# Patient Record
Sex: FEMALE | Race: WHITE | NOT HISPANIC OR LATINO | Employment: UNEMPLOYED | URBAN - METROPOLITAN AREA
[De-identification: names, ages, dates, MRNs, and addresses within clinical notes are randomized per-mention and may not be internally consistent; named-entity substitution may affect disease eponyms.]

---

## 2017-01-13 ENCOUNTER — APPOINTMENT (OUTPATIENT)
Dept: VASCULAR LAB | Facility: MEDICAL CENTER | Age: 62
End: 2017-01-13
Attending: NURSE PRACTITIONER
Payer: MEDICARE

## 2017-01-30 ENCOUNTER — APPOINTMENT (OUTPATIENT)
Dept: VASCULAR LAB | Facility: MEDICAL CENTER | Age: 62
End: 2017-01-30
Attending: NURSE PRACTITIONER
Payer: MEDICARE

## 2017-02-03 ENCOUNTER — APPOINTMENT (OUTPATIENT)
Dept: VASCULAR LAB | Facility: MEDICAL CENTER | Age: 62
End: 2017-02-03
Attending: NURSE PRACTITIONER
Payer: MEDICARE

## 2017-02-10 ENCOUNTER — ANTICOAGULATION VISIT (OUTPATIENT)
Dept: VASCULAR LAB | Facility: MEDICAL CENTER | Age: 62
End: 2017-02-10
Attending: NURSE PRACTITIONER
Payer: MEDICARE

## 2017-02-10 DIAGNOSIS — I26.02 ACUTE SADDLE PULMONARY EMBOLISM WITH ACUTE COR PULMONALE (HCC): ICD-10-CM

## 2017-02-10 DIAGNOSIS — I63.00 CEREBROVASCULAR ACCIDENT (CVA) DUE TO THROMBOSIS OF PRECEREBRAL ARTERY (HCC): ICD-10-CM

## 2017-02-10 DIAGNOSIS — I27.82 CHRONIC SEPTIC PULMONARY EMBOLISM WITH ACUTE COR PULMONALE (HCC): ICD-10-CM

## 2017-02-10 DIAGNOSIS — I48.91 ATRIAL FIBRILLATION, UNSPECIFIED TYPE (HCC): ICD-10-CM

## 2017-02-10 DIAGNOSIS — I26.01 CHRONIC SEPTIC PULMONARY EMBOLISM WITH ACUTE COR PULMONALE (HCC): ICD-10-CM

## 2017-02-10 DIAGNOSIS — G45.9 TRANSIENT CEREBRAL ISCHEMIA, UNSPECIFIED TYPE: ICD-10-CM

## 2017-02-10 DIAGNOSIS — I82.401 ACUTE DEEP VEIN THROMBOSIS (DVT) OF RIGHT LOWER EXTREMITY, UNSPECIFIED VEIN (HCC): ICD-10-CM

## 2017-02-10 LAB
INR BLD: 2.5 (ref 0.9–1.2)
INR PPP: 2.5 (ref 2–3.5)

## 2017-02-10 PROCEDURE — 85610 PROTHROMBIN TIME: CPT

## 2017-02-10 PROCEDURE — 99211 OFF/OP EST MAY X REQ PHY/QHP: CPT | Performed by: NURSE PRACTITIONER

## 2017-02-10 NOTE — MR AVS SNAPSHOT
Elena Warren   2/10/2017 2:40 PM   Anticoagulation Visit   MRN: [id number]    Department:  Vascular Medicine   Dept Phone:  [phone]    Description:  Female : 1955   Provider:  [facility] EXAM 1           Allergies as of 2/10/2017     No Known Allergies      You were diagnosed with     Acute deep vein thrombosis (DVT) of right lower extremity, unspecified vein (CMS-HCC)   [5703716]       Atrial fibrillation, unspecified type (CMS-HCC)   [0036725]       Acute saddle pulmonary embolism with acute cor pulmonale (CMS-HCC)   [1692153]       Chronic septic pulmonary embolism with acute cor pulmonale (CMS-HCC)   [5607391]       Cerebrovascular accident (CVA) due to thrombosis of precerebral artery (CMS-HCC)   [5787612]       Transient cerebral ischemia, unspecified type   [0621875]         Vital Signs     Smoking Status                   Former Smoker           Basic Information     Date Of Birth Sex Race Ethnicity Preferred Language    1955 Female White Non- English      Your appointments     Feb 10, 2017  2:40 PM   Established Patient with [facility] EXAM 1   [facility]  (--)    [address] [place] [id number]   [phone]              Problem List              ICD-10-CM Priority Class Noted - Resolved    Atrial fibrillation [427.31] I48.91   2015 - Present    Deep vein thrombosis [453.40] I82.409   2015 - Present    Acute pulmonary embolism [415.19] I26.99   2015 - Present    Pulmonary embolism [415.19] I26.99   2015 - Present    Stroke [434.91] I63.9   2015 - Present    Transient ischemic attack [435.9] G45.9   2015 - Present    Deep vein thrombosis (DVT) (CMS-[facility]) I82.409   11/10/2015 - Present    Atrial fibrillation (CMS-[facility]) I48.91   11/10/2015 - Present    Hyperglycemia R73.9   2016 - Present    Bilateral leg edema R60.0   2016 - Present    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    5/14/2016 - Present    PRAMOD (obstructive sleep apnea) G47.33   5/14/2016 - Present    Hyperlipidemia E78.5   5/14/2016 - Present    Hypertension I10   5/14/2016 - Present    CHF (congestive heart failure) (CMS-HCC) I50.9   5/14/2016 - Present    Hemiparesis affecting left side as late effect of cerebrovascular accident (CVA) (CMS-HCC) I69.354   5/14/2016 - Present      Health Maintenance        Date Due Completion Dates    IMM DTaP/Tdap/Td Vaccine (1 - Tdap) 12/4/1973 ---    PAP SMEAR 12/4/1975 ---    MAMMOGRAM 12/4/1994 ---    COLONOSCOPY 12/4/2004 ---            Results     POCT Protime      Component    INR    2.5                        Current Immunizations     13-VALENT PCV PREVNAR 9/27/2016    Influenza Vaccine Adult HD 9/27/2016    SHINGLES VACCINE 10/4/2016      Below and/or attached are the medications your provider expects you to take. Review all of your home medications and newly ordered medications with your provider and/or pharmacist. Follow medication instructions as directed by your provider and/or pharmacist. Please keep your medication list with you and share with your provider. Update the information when medications are discontinued, doses are changed, or new medications (including over-the-counter products) are added; and carry medication information at all times in the event of emergency situations     Allergies:  No Known Allergies          Medications  Valid as of: February 10, 2017 -  2:36 PM    Generic Name Brand Name Tablet Size Instructions for use    AmLODIPine Besylate (Tab) NORVASC 10 MG Take 1 Tab by mouth every day.        Atorvastatin Calcium (Tab) LIPITOR 20 MG Take 1 Tab by mouth every day.        Carvedilol (Tab) COREG 25 MG Take 1 Tab by mouth 2 times a day, with meals.        Gabapentin (Tab) NEURONTIN 600 MG Take 1 Tab by mouth 2 times a day.        Hydrocodone-Acetaminophen (Tab) NORCO 5-325 MG Take 1 Tab by mouth every 12 hours as needed (for pain).        HydrOXYzine HCl  (Tab) ATARAX 25 MG Take 1-2 Tabs by mouth every 8 hours as needed for Anxiety.        Warfarin Sodium (Tab) COUMADIN 6 MG Take one-half to one (1/2-1) tablet daily as directed by coumadin clinic        Warfarin Sodium (Tab) COUMADIN 6 MG Take 1 Tab by mouth every day.        .                 Medicines prescribed today were sent to:     [facility] [address] [place] [address] (), [place] - [address]    [address] () [place] [address]    Phone: [phone] Fax: [phone]    Open 24 Hours?: No      Medication refill instructions:       If your prescription bottle indicates you have medication refills left, it is not necessary to call your provider’s office. Please contact your pharmacy and they will refill your medication.    If your prescription bottle indicates you do not have any refills left, you may request refills at any time through one of the following ways: The online [employer] system (except Urgent Care), by calling your provider’s office, or by asking your pharmacy to contact your provider’s office with a refill request. Medication refills are processed only during regular business hours and may not be available until the next business day. Your provider may request additional information or to have a follow-up visit with you prior to refilling your medication.   *Please Note: Medication refills are assigned a new Rx number when refilled electronically. Your pharmacy may indicate that no refills were authorized even though a new prescription for the same medication is available at the pharmacy. Please request the medicine by name with the pharmacy before contacting your provider for a refill.        Warfarin Dosing Calendar   February 2017 Details    Sun Mon Tue Wed Thu Fri Sat        1               2               3               4                 5               6               7               8               9               10   2.5   6 mg   See details      11      3 mg           12      3 mg            13      6 mg         14      3 mg         15      6 mg         16      3 mg         17      6 mg         18      3 mg           19      3 mg         20      6 mg         21      3 mg         22      6 mg         23      3 mg         24      6 mg         25      3 mg           26      3 mg         27      6 mg         28      3 mg              Date Details   02/10 This INR check   INR: 2.5               How to take your warfarin dose     To take:  3 mg Take 0.5 of a 6 mg tablet.    To take:  6 mg Take 1 of the 6 mg tablets.           Warfarin Dosing Calendar   March 2017 Details    Sun Mon Tue Wed Thu Fri Sat        1      6 mg         2      3 mg         3      6 mg         4      3 mg           5      3 mg         6      6 mg         7      3 mg         8      6 mg         9      3 mg         10      6 mg         11      3 mg           12      3 mg         13      6 mg         14      3 mg         15      6 mg         16      3 mg         17      6 mg         18      3 mg           19      3 mg         20      6 mg         21      3 mg         22      6 mg         23      3 mg         24      6 mg         25      3 mg           26      3 mg         27      6 mg         28      3 mg         29      6 mg         30      3 mg         31      6 mg           Date Details   No additional details    Date of next INR:  3/31/2017         How to take your warfarin dose     To take:  3 mg Take 0.5 of a 6 mg tablet.    To take:  6 mg Take 1 of the 6 mg tablets.              [employer] Access Code: [email or web address]  Expires: 3/12/2017  2:36 PM    [employer]  A secure, online tool to manage your health information     [employer]® is a secure, online tool that connects you to your personalized health information from the privacy of your home -- day or night - making it very easy for you to manage your healthcare. Once the activation process is completed, you can even access your medical information using the  [facility] taylor, which is available for free in the Apple Taylor store or Google Play store.     [facility] provides the following levels of access (as shown below):   My Chart Features   [facility] Doctor [facility]  Specialists [facility]  Urgent  Care [facility]  Primary Care  Doctor   Email your healthcare team securely and privately 24/7 X X X    Manage appointments: schedule your next appointment; view details of past/upcoming appointments X      Request prescription refills. X      View recent personal medical records, including lab and immunizations X X X X   View health record, including health history, allergies, medications X X X X   Read reports about your outpatient visits, procedures, consult and ER notes X X X X   See your discharge summary, which is a recap of your hospital and/or ER visit that includes your diagnosis, lab results, and care plan. X X       How to register for [facility]:  1. Go to  [email or web address].  2. Click on the Sign Up Now box, which takes you to the New Member Sign Up page. You will need to provide the following information:  a. Enter your [facility] Access Code exactly as it appears at the top of this page. (You will not need to use this code after you’ve completed the sign-up process. If you do not sign up before the expiration date, you must request a new code.)   b. Enter your date of birth.   c. Enter your home email address.   d. Click Submit, and follow the next screen’s instructions.  3. Create a [facility] ID. This will be your [facility] login ID and cannot be changed, so think of one that is secure and easy to remember.  4. Create a [facility] password. You can change your password at any time.  5. Enter your Password Reset Question and Answer. This can be used at a later time if you forget your password.   6. Enter your e-mail address. This allows you to receive e-mail notifications when new information is available in [facility].  7. Click Sign Up. You can now view your health  information.    For assistance activating your [employer] account, call [phone]

## 2017-02-10 NOTE — PROGRESS NOTES
Anticoagulation Summary as of 2/10/2017     INR goal 2.0-3.0   Selected INR 2.5 (2/10/2017)   Maintenance plan 6 mg (6 mg x 1) on Mon, Wed, Fri; 3 mg (6 mg x 0.5) all other days   Weekly total 30 mg   Plan last modified NORMA Flores (5/22/2015)   Next INR check 3/31/2017   Target end date Indefinite    Indications   Deep vein thrombosis (DVT) (CMS-[facility]) [I82.409]  Atrial fibrillation (CMS-[facility]) [I48.91]  Pulmonary embolism [415.19] [I26.99]  Stroke [434.91] [I63.9]  Transient ischemic attack [435.9] [G45.9]         Anticoagulation Episode Summary     INR check location     Preferred lab     Send INR reminders to     Comments       Anticoagulation Care Providers     Provider Role Specialty Phone number    Carole Mason M.D. Referring Internal Medicine [phone]    [facility] Responsible  [phone]        Patient is therapeutic today. Denies any medication or diet changes. No current symptoms of bleeding or thrombosis reported. Continue current regimen. Follow up in 8 weeks.    Next Appointment: Pt will call to schedule next appt    Nia YEN

## 2017-03-30 ENCOUNTER — APPOINTMENT (OUTPATIENT)
Dept: VASCULAR LAB | Facility: MEDICAL CENTER | Age: 62
End: 2017-03-30
Attending: INTERNAL MEDICINE
Payer: MEDICARE

## 2017-06-21 ENCOUNTER — TELEPHONE (OUTPATIENT)
Dept: CARDIOLOGY | Facility: MEDICAL CENTER | Age: 62
End: 2017-06-21

## 2017-06-21 NOTE — TELEPHONE ENCOUNTER
Elena has been identified as a patient who could benefit from the services of the Heart Failure Program with [facility] Cardiology department. Please review the pended referral order and sign if this is a service that you wish for Elena to receive.

## 2017-08-09 ENCOUNTER — ANTICOAGULATION VISIT (OUTPATIENT)
Dept: VASCULAR LAB | Facility: MEDICAL CENTER | Age: 62
End: 2017-08-09
Attending: INTERNAL MEDICINE
Payer: MEDICARE

## 2017-08-09 VITALS — SYSTOLIC BLOOD PRESSURE: 117 MMHG | HEART RATE: 56 BPM | DIASTOLIC BLOOD PRESSURE: 50 MMHG

## 2017-08-09 DIAGNOSIS — I63.00 CEREBROVASCULAR ACCIDENT (CVA) DUE TO THROMBOSIS OF PRECEREBRAL ARTERY (HCC): ICD-10-CM

## 2017-08-09 DIAGNOSIS — I27.82 CHRONIC SEPTIC PULMONARY EMBOLISM WITH ACUTE COR PULMONALE (HCC): ICD-10-CM

## 2017-08-09 DIAGNOSIS — G45.9 TRANSIENT CEREBRAL ISCHEMIA, UNSPECIFIED TYPE: ICD-10-CM

## 2017-08-09 DIAGNOSIS — I26.01 CHRONIC SEPTIC PULMONARY EMBOLISM WITH ACUTE COR PULMONALE (HCC): ICD-10-CM

## 2017-08-09 DIAGNOSIS — I48.91 ATRIAL FIBRILLATION, UNSPECIFIED TYPE (HCC): ICD-10-CM

## 2017-08-09 DIAGNOSIS — I82.401 ACUTE DEEP VEIN THROMBOSIS (DVT) OF RIGHT LOWER EXTREMITY, UNSPECIFIED VEIN (HCC): ICD-10-CM

## 2017-08-09 LAB — INR PPP: 2.1 (ref 2–3.5)

## 2017-08-09 PROCEDURE — 99212 OFFICE O/P EST SF 10 MIN: CPT | Performed by: NURSE PRACTITIONER

## 2017-08-09 PROCEDURE — 85610 PROTHROMBIN TIME: CPT

## 2017-08-09 RX ORDER — WARFARIN SODIUM 3 MG/1
TABLET ORAL
Qty: 30 TAB | Refills: 1 | Status: SHIPPED | OUTPATIENT
Start: 2017-08-09 | End: 2017-10-30 | Stop reason: SDUPTHER

## 2017-08-09 NOTE — PROGRESS NOTES
Anticoagulation Summary as of 8/9/2017     INR goal 2.0-3.0   Selected INR 2.1 (8/9/2017)   Maintenance plan 6 mg (6 mg x 1) on Mon, Wed, Fri; 3 mg (6 mg x 0.5) all other days   Weekly total 30 mg   Plan last modified NORMA Flores (5/22/2015)   Next INR check 10/31/2017   Target end date Indefinite    Indications   Deep vein thrombosis (DVT) (CMS-HCC) [I82.409]  Atrial fibrillation (CMS-HCC) [I48.91]  Pulmonary embolism [415.19] [I26.99]  Stroke [434.91] [I63.9]  Transient ischemic attack [435.9] [G45.9]         Anticoagulation Episode Summary     INR check location     Preferred lab     Send INR reminders to     Comments       Anticoagulation Care Providers     Provider Role Specialty Phone number    Carole Mason M.D. Referring Internal Medicine [phone]    [facility] Responsible  [phone]        Patient is therapeutic today. Denies any medication or diet changes. No current symptoms of bleeding or thrombosis reported. States she is having trouble breaking her 6 mg tablets in half, even with a pill cutter. Will send rx for 3 mg tablets. Education given regarding new tablet strength and color. She prefers to use the 6 mg tablets as well. Continue current regimen. Follow up in 8-10 weeks. Pt will call for next appointment. Encouraged to go no longer than 12 weeks between visits.    Next Appointment: Pt will call to schedule next visit    Nia YEN

## 2017-08-09 NOTE — MR AVS SNAPSHOT
Elena Warren   2017 11:15 AM   Anticoagulation Visit   MRN: [id number]    Department:  Vascular Medicine   Dept Phone:  [phone]    Description:  Female : 1955   Provider:  [facility] EXAM 4           Allergies as of 2017     No Known Allergies      You were diagnosed with     Acute deep vein thrombosis (DVT) of right lower extremity, unspecified vein (CMS-[facility])   [6956079]       Atrial fibrillation, unspecified type (CMS-[facility])   [2941849]       Chronic septic pulmonary embolism with acute cor pulmonale (CMS-[facility])   [3019718]       Cerebrovascular accident (CVA) due to thrombosis of precerebral artery (CMS-[facility])   [0406083]       Transient cerebral ischemia, unspecified type   [2107868]         Vital Signs     Blood Pressure Pulse Smoking Status             117/50 mmHg 56 Former Smoker         Basic Information     Date Of Birth Sex Race Ethnicity Preferred Language    1955 Female White Non- English      Your appointments     Aug 09, 2017 11:15 AM   Established Patient with [facility] EXAM 4   [facility]  (--)    [address] [place] [address]   [phone]              Problem List              ICD-10-CM Priority Class Noted - Resolved    Atrial fibrillation [427.31] I48.91   2015 - Present    Deep vein thrombosis [453.40] I82.409   2015 - Present    Acute pulmonary embolism [415.19] I26.99   2015 - Present    Pulmonary embolism [415.19] I26.99   2015 - Present    Stroke [434.91] I63.9   2015 - Present    Transient ischemic attack [435.9] G45.9   2015 - Present    Deep vein thrombosis (DVT) (CMS-[facility]) I82.409   11/10/2015 - Present    Atrial fibrillation (CMS-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   2016 - Present    PRAMOD (obstructive sleep apnea) G47.33    5/14/2016 - Present    Hyperlipidemia E78.5   5/14/2016 - Present    Hypertension I10   5/14/2016 - Present    CHF (congestive heart failure) (CMS-HCC) I50.9   5/14/2016 - Present    Hemiparesis affecting left side as late effect of cerebrovascular accident (CVA) (CMS-HCC) I69.354   5/14/2016 - Present      Health Maintenance        Date Due Completion Dates    IMM DTaP/Tdap/Td Vaccine (1 - Tdap) 12/4/1973 ---    PAP SMEAR 12/4/1975 ---    MAMMOGRAM 12/4/1994 ---    COLONOSCOPY 12/4/2004 ---    IMM INFLUENZA (1) 9/1/2017 9/27/2016            Results     POCT Protime      Component    INR    2.1                        Current Immunizations     13-VALENT PCV PREVNAR 9/27/2016    Influenza Vaccine Adult HD 9/27/2016    SHINGLES VACCINE 10/4/2016      Below and/or attached are the medications your provider expects you to take. Review all of your home medications and newly ordered medications with your provider and/or pharmacist. Follow medication instructions as directed by your provider and/or pharmacist. Please keep your medication list with you and share with your provider. Update the information when medications are discontinued, doses are changed, or new medications (including over-the-counter products) are added; and carry medication information at all times in the event of emergency situations     Allergies:  No Known Allergies          Medications  Valid as of: August 09, 2017 - 10:59 AM    Generic Name Brand Name Tablet Size Instructions for use    AmLODIPine Besylate (Tab) NORVASC 10 MG Take 1 Tab by mouth every day.        Atorvastatin Calcium (Tab) LIPITOR 20 MG Take 1 Tab by mouth every day.        Carvedilol (Tab) COREG 25 MG Take 1 Tab by mouth 2 times a day, with meals.        Gabapentin (Tab) NEURONTIN 600 MG Take 1 Tab by mouth 2 times a day.        Hydrocodone-Acetaminophen (Tab) NORCO 5-325 MG Take 1 Tab by mouth every 12 hours as needed (for pain).        HydrOXYzine HCl (Tab) ATARAX 25 MG Take  1-2 Tabs by mouth every 8 hours as needed for Anxiety.        Warfarin Sodium (Tab) COUMADIN 6 MG Take one-half to one (1/2-1) tablet daily as directed by coumadin clinic        Warfarin Sodium (Tab) COUMADIN 6 MG Take 1 Tab by mouth every day.        .                 Medicines prescribed today were sent to:     [facility] [address] [place] [address] (), [place] - [address]    [address] () [place] [address]    Phone: [phone] Fax: [phone]    Open 24 Hours?: No      Medication refill instructions:       If your prescription bottle indicates you have medication refills left, it is not necessary to call your provider’s office. Please contact your pharmacy and they will refill your medication.    If your prescription bottle indicates you do not have any refills left, you may request refills at any time through one of the following ways: The online [employer] system (except Urgent Care), by calling your provider’s office, or by asking your pharmacy to contact your provider’s office with a refill request. Medication refills are processed only during regular business hours and may not be available until the next business day. Your provider may request additional information or to have a follow-up visit with you prior to refilling your medication.   *Please Note: Medication refills are assigned a new Rx number when refilled electronically. Your pharmacy may indicate that no refills were authorized even though a new prescription for the same medication is available at the pharmacy. Please request the medicine by name with the pharmacy before contacting your provider for a refill.        Warfarin Dosing Calendar   August 2017 Details    Sun Mon Tue Wed Thu Fri Sat       1               2               3               4               5                 6               7               8               9   2.1   6 mg   See details      10      3 mg         11      6 mg         12      3 mg           13      3 mg           14      6 mg         15      3 mg         16      6 mg         17      3 mg         18      6 mg         19      3 mg           20      3 mg         21      6 mg         22      3 mg         23      6 mg         24      3 mg         25      6 mg         26      3 mg           27      3 mg         28      6 mg         29      3 mg         30      6 mg         31      3 mg            Date Details   08/09 This INR check   INR: 2.1               How to take your warfarin dose     To take:  3 mg Take 0.5 of a 6 mg tablet.    To take:  6 mg Take 1 of the 6 mg tablets.           Warfarin Dosing Calendar   September 2017 Details    Sun Mon Tue Wed Thu Fri Sat          1      6 mg         2      3 mg           3      3 mg         4      6 mg         5      3 mg         6      6 mg         7      3 mg         8      6 mg         9      3 mg           10      3 mg         11      6 mg         12      3 mg         13      6 mg         14      3 mg         15      6 mg         16      3 mg           17      3 mg         18      6 mg         19      3 mg         20      6 mg         21      3 mg         22      6 mg         23      3 mg           24      3 mg         25      6 mg         26      3 mg         27      6 mg         28      3 mg         29      6 mg         30      3 mg          Date Details   No additional details            How to take your warfarin dose     To take:  3 mg Take 0.5 of a 6 mg tablet.    To take:  6 mg Take 1 of the 6 mg tablets.           Warfarin Dosing Calendar   October 2017 Details    Sun Mon Tue Wed Thu Fri Sat     1      3 mg         2      6 mg         3      3 mg         4      6 mg         5      3 mg         6      6 mg         7      3 mg           8      3 mg         9      6 mg         10      3 mg         11      6 mg         12      3 mg         13      6 mg         14      3 mg           15      3 mg         16      6 mg         17      3 mg         18      6 mg         19       3 mg         20      6 mg         21      3 mg           22      3 mg         23      6 mg         24      3 mg         25      6 mg         26      3 mg         27      6 mg         28      3 mg           29      3 mg         30      6 mg         31      3 mg              Date Details   No additional details    Date of next INR:  10/31/2017         How to take your warfarin dose     To take:  3 mg Take 0.5 of a 6 mg tablet.    To take:  6 mg Take 1 of the 6 mg tablets.              [employer] Social Access Code: [email or web address]  Expires: 9/8/2017 10:59 AM    Salmon Social  A secure, online tool to manage your health information     [employer]’s Salmon Social® is a secure, online tool that connects you to your personalized health information from the privacy of your home -- day or night - making it very easy for you to manage your healthcare. Once the activation process is completed, you can even access your medical information using the Salmon Social taylor, which is available for free in the [employer] Taylor store or Google Play store.     Salmon Social provides the following levels of access (as shown below):   My Chart Features   [facility] Doctor [facility]  Specialists [facility]  Urgent  Care Non-[facility]  Primary Care  Doctor   Email your healthcare team securely and privately 24/7 X X X    Manage appointments: schedule your next appointment; view details of past/upcoming appointments X      Request prescription refills. X      View recent personal medical records, including lab and immunizations X X X X   View health record, including health history, allergies, medications X X X X   Read reports about your outpatient visits, procedures, consult and ER notes X X X X   See your discharge summary, which is a recap of your hospital and/or ER visit that includes your diagnosis, lab results, and care plan. X X       How to register for [employer] Social:  1. Go to  [email or web address].  2. Click on the Sign Up Now box, which takes you to the New Member  Sign Up page. You will need to provide the following information:  a. Enter your [employer] Access Code exactly as it appears at the top of this page. (You will not need to use this code after you’ve completed the sign-up process. If you do not sign up before the expiration date, you must request a new code.)   b. Enter your date of birth.   c. Enter your home email address.   d. Click Submit, and follow the next screen’s instructions.  3. Create a Qustreet ID. This will be your [employer]reet login ID and cannot be changed, so think of one that is secure and easy to remember.  4. Create a [employer]reet password. You can change your password at any time.  5. Enter your Password Reset Question and Answer. This can be used at a later time if you forget your password.   6. Enter your e-mail address. This allows you to receive e-mail notifications when new information is available in [employer]reet.  7. Click Sign Up. You can now view your health information.    For assistance activating your [employer] account, call [phone]

## 2017-08-18 LAB — INR BLD: 2.1 (ref 0.9–1.2)

## 2017-10-19 ENCOUNTER — TELEPHONE (OUTPATIENT)
Dept: VASCULAR LAB | Facility: MEDICAL CENTER | Age: 62
End: 2017-10-19

## 2017-10-19 DIAGNOSIS — I63.00 CEREBROVASCULAR ACCIDENT (CVA) DUE TO THROMBOSIS OF PRECEREBRAL ARTERY (HCC): ICD-10-CM

## 2017-10-19 DIAGNOSIS — I82.401 ACUTE DEEP VEIN THROMBOSIS (DVT) OF RIGHT LOWER EXTREMITY, UNSPECIFIED VEIN (HCC): ICD-10-CM

## 2017-10-19 DIAGNOSIS — I26.01 CHRONIC SEPTIC PULMONARY EMBOLISM WITH ACUTE COR PULMONALE (HCC): ICD-10-CM

## 2017-10-19 DIAGNOSIS — G45.9 TRANSIENT CEREBRAL ISCHEMIA, UNSPECIFIED TYPE: ICD-10-CM

## 2017-10-19 DIAGNOSIS — I48.91 ATRIAL FIBRILLATION, UNSPECIFIED TYPE (HCC): ICD-10-CM

## 2017-10-19 DIAGNOSIS — I27.82 CHRONIC SEPTIC PULMONARY EMBOLISM WITH ACUTE COR PULMONALE (HCC): ICD-10-CM

## 2017-10-19 NOTE — TELEPHONE ENCOUNTER
[facility]    Left  requesting pt to contact the clinic to confirm her dosing regimen.  Pt has active 3 mg and 6 mg tablets but notes reflect she is splitting 6 mg tablets.  Confirm dosing regimen prior to refilling 3 mg tablets.    Shamar Noble, JuanD

## 2017-10-23 RX ORDER — WARFARIN SODIUM 3 MG/1
TABLET ORAL
Qty: 30 TAB | Refills: 1 | OUTPATIENT
Start: 2017-10-23

## 2017-10-30 DIAGNOSIS — I27.82 CHRONIC SEPTIC PULMONARY EMBOLISM WITH ACUTE COR PULMONALE (HCC): ICD-10-CM

## 2017-10-30 DIAGNOSIS — I48.91 ATRIAL FIBRILLATION, UNSPECIFIED TYPE (HCC): ICD-10-CM

## 2017-10-30 DIAGNOSIS — I26.01 CHRONIC SEPTIC PULMONARY EMBOLISM WITH ACUTE COR PULMONALE (HCC): ICD-10-CM

## 2017-10-30 DIAGNOSIS — I63.00 CEREBROVASCULAR ACCIDENT (CVA) DUE TO THROMBOSIS OF PRECEREBRAL ARTERY (HCC): ICD-10-CM

## 2017-10-30 DIAGNOSIS — I82.401 ACUTE DEEP VEIN THROMBOSIS (DVT) OF RIGHT LOWER EXTREMITY, UNSPECIFIED VEIN (HCC): ICD-10-CM

## 2017-10-30 DIAGNOSIS — G45.9 TRANSIENT CEREBRAL ISCHEMIA, UNSPECIFIED TYPE: ICD-10-CM

## 2017-10-30 RX ORDER — WARFARIN SODIUM 3 MG/1
TABLET ORAL
Qty: 30 TAB | Refills: 5 | Status: SHIPPED | OUTPATIENT
Start: 2017-10-30 | End: 2017-12-11 | Stop reason: SDUPTHER

## 2017-11-14 DIAGNOSIS — Z86.73 HISTORY OF STROKE: ICD-10-CM

## 2017-12-11 ENCOUNTER — ANTICOAGULATION VISIT (OUTPATIENT)
Dept: VASCULAR LAB | Facility: MEDICAL CENTER | Age: 62
End: 2017-12-11
Attending: INTERNAL MEDICINE
Payer: MEDICARE

## 2017-12-11 DIAGNOSIS — I27.82 CHRONIC SEPTIC PULMONARY EMBOLISM WITH ACUTE COR PULMONALE (HCC): ICD-10-CM

## 2017-12-11 DIAGNOSIS — I48.91 ATRIAL FIBRILLATION, UNSPECIFIED TYPE (HCC): ICD-10-CM

## 2017-12-11 DIAGNOSIS — I82.401 ACUTE DEEP VEIN THROMBOSIS (DVT) OF RIGHT LOWER EXTREMITY, UNSPECIFIED VEIN (HCC): ICD-10-CM

## 2017-12-11 DIAGNOSIS — G45.9 TRANSIENT CEREBRAL ISCHEMIA, UNSPECIFIED TYPE: ICD-10-CM

## 2017-12-11 DIAGNOSIS — I63.00 CEREBROVASCULAR ACCIDENT (CVA) DUE TO THROMBOSIS OF PRECEREBRAL ARTERY (HCC): ICD-10-CM

## 2017-12-11 DIAGNOSIS — I26.01 CHRONIC SEPTIC PULMONARY EMBOLISM WITH ACUTE COR PULMONALE (HCC): ICD-10-CM

## 2017-12-11 LAB
INR BLD: 2.4 (ref 0.9–1.2)
INR PPP: 2.4 (ref 2–3.5)

## 2017-12-11 PROCEDURE — 99211 OFF/OP EST MAY X REQ PHY/QHP: CPT | Mod: 25

## 2017-12-11 PROCEDURE — 90686 IIV4 VACC NO PRSV 0.5 ML IM: CPT

## 2017-12-11 PROCEDURE — 90471 IMMUNIZATION ADMIN: CPT

## 2017-12-11 PROCEDURE — 85610 PROTHROMBIN TIME: CPT

## 2017-12-11 RX ORDER — WARFARIN SODIUM 3 MG/1
TABLET ORAL
Qty: 30 TAB | Refills: 5 | Status: SHIPPED | OUTPATIENT
Start: 2017-12-11 | End: 2018-06-13 | Stop reason: SDUPTHER

## 2017-12-11 NOTE — PROGRESS NOTES
Anticoagulation Summary  As of 12/11/2017    INR goal:   2.0-3.0   TTR:   83.6 % (2.5 y)   Today's INR:   2.4   Maintenance plan:   6 mg (6 mg x 1) on Mon, Wed, Fri; 3 mg (6 mg x 0.5) all other days   Weekly total:   30 mg   Plan last modified:   NORMA Flores (8/9/2017)   Next INR check:   2/5/2018   Target end date:   Indefinite    Indications    Deep vein thrombosis (DVT) (CMS-HCC) [I82.409]  Atrial fibrillation (CMS-HCC) [I48.91]  Pulmonary embolism [415.19] [I26.99]  Stroke [434.91] [I63.9]  Transient ischemic attack [435.9] [G45.9]             Anticoagulation Episode Summary     INR check location:       Preferred lab:       Send INR reminders to:       Comments:         Anticoagulation Care Providers     Provider Role Specialty Phone number    Carole Mason M.D. Referring Internal Medicine [phone]    [facility] Responsible  [phone]        Anticoagulation Patient Findings  Patient Findings     Negatives:   Signs/symptoms of thrombosis, Signs/symptoms of bleeding, Laboratory test error suspected, Change in health, Change in alcohol use, Change in activity, Upcoming invasive procedure, Emergency department visit, Upcoming dental procedure, Missed doses, Extra doses, Change in medications, Change in diet/appetite, Hospital admission, Bruising, Other complaints        HPI:   Seen in clinic today, on anticoagulation therapy with warfarin for stroke prevention due to history of atrial fibrillation, DVT    Previous INR  2.1 on 08/09/2017     Does patient have any changes to current medical/health status since last appt (Y/N):  NO  Does patient have any signs/symptoms of bleeding and/or thrombosis since the last appt (Y/N):  NO  Does patient have any interval changes to diet or medications since last appt (Y/N):  NO  Are there any complications or cost restrictions with current therapy (Y/N):  NO      Vitals:  Patient declines BP/vitals check today      Asssessment:       INR  therapeutic    Patient consented to and received influenza vaccination during today's visit.     Plan:  Instructed patient to continue with  current warfarin regimen.       Follow up:  Because warfarin is a high risk medication and current CHEST guidelines recommend regular monitoring intervals (few days up to 12 weeks), will have patient return to clinic in 8 weeks to recheck INR.    Laureen Sawyer, PharmD

## 2018-02-05 ENCOUNTER — ANTICOAGULATION VISIT (OUTPATIENT)
Dept: VASCULAR LAB | Facility: MEDICAL CENTER | Age: 63
End: 2018-02-05
Attending: INTERNAL MEDICINE
Payer: MEDICARE

## 2018-02-05 DIAGNOSIS — I48.91 ATRIAL FIBRILLATION, UNSPECIFIED TYPE (HCC): ICD-10-CM

## 2018-02-05 DIAGNOSIS — I82.401 ACUTE DEEP VEIN THROMBOSIS (DVT) OF RIGHT LOWER EXTREMITY, UNSPECIFIED VEIN (HCC): ICD-10-CM

## 2018-02-05 DIAGNOSIS — I27.82 CHRONIC SEPTIC PULMONARY EMBOLISM WITH ACUTE COR PULMONALE (HCC): ICD-10-CM

## 2018-02-05 DIAGNOSIS — G45.9 TRANSIENT CEREBRAL ISCHEMIA, UNSPECIFIED TYPE: ICD-10-CM

## 2018-02-05 DIAGNOSIS — I26.01 CHRONIC SEPTIC PULMONARY EMBOLISM WITH ACUTE COR PULMONALE (HCC): ICD-10-CM

## 2018-02-05 LAB
INR BLD: 2.7 (ref 0.9–1.2)
INR PPP: 2.7 (ref 2–3.5)

## 2018-02-05 PROCEDURE — 99211 OFF/OP EST MAY X REQ PHY/QHP: CPT

## 2018-02-05 PROCEDURE — 85610 PROTHROMBIN TIME: CPT

## 2018-02-05 NOTE — PROGRESS NOTES
Anticoagulation Summary  As of 2/5/2018    INR goal:   2.0-3.0   TTR:   84.5 % (2.7 y)   Today's INR:   2.7   Maintenance plan:   6 mg (6 mg x 1) on Mon, Wed, Fri; 3 mg (6 mg x 0.5) all other days   Weekly total:   30 mg   Plan last modified:   NORMA Flores (8/9/2017)   Next INR check:   4/9/2018   Target end date:   Indefinite    Indications    Deep vein thrombosis (DVT) (CMS-HCC) [I82.409]  Atrial fibrillation (CMS-HCC) [I48.91]  Pulmonary embolism [415.19] [I26.99]  Stroke [434.91] [I63.9]  Transient ischemic attack [435.9] [G45.9]             Anticoagulation Episode Summary     INR check location:       Preferred lab:       Send INR reminders to:       Comments:         Anticoagulation Care Providers     Provider Role Specialty Phone number    Carole Mason M.D. Referring Internal Medicine [phone]    [facility] Anticoagulation Services Responsible  [phone]        Anticoagulation Patient Findings  Patient Findings     Negatives:   Signs/symptoms of thrombosis, Signs/symptoms of bleeding, Laboratory test error suspected, Change in health, Change in alcohol use, Change in activity, Upcoming invasive procedure, Emergency department visit, Upcoming dental procedure, Missed doses, Extra doses, Change in medications, Change in diet/appetite, Hospital admission, Bruising, Other complaints        HPI:   Seen in clinic today, on anticoagulation therapy with warfarin for stroke prevention due to history of atrial fibrillation, PE, DVT    Previous INR  2.4 on 12/11/17     Does patient have any changes to current medical/health status since last appt (Y/N):  NO  Does patient have any signs/symptoms of bleeding and/or thrombosis since the last appt (Y/N):  NO  Does patient have any interval changes to diet or medications since last appt (Y/N):  NO  Are there any complications or cost restrictions with current therapy (Y/N):  NO      Vitals:  Patient declines BP/vitals check today      Asssessment:       INR  therapeutic     Plan:  Instructed patient to continue with current warfarin regimen.       Follow up:  Because warfarin is a high risk medication and current CHEST guidelines recommend regular monitoring intervals (few days up to 12 weeks), will have patient return to clinic in 9 weeks to recheck INR.  (patient will call to make an appointment).    Laureen Sawyer, JuanD

## 2018-04-09 ENCOUNTER — APPOINTMENT (OUTPATIENT)
Dept: VASCULAR LAB | Facility: MEDICAL CENTER | Age: 63
End: 2018-04-09
Payer: MEDICARE

## 2018-06-11 ENCOUNTER — APPOINTMENT (OUTPATIENT)
Dept: VASCULAR LAB | Facility: MEDICAL CENTER | Age: 63
End: 2018-06-11
Payer: MEDICARE

## 2018-06-13 ENCOUNTER — ANTICOAGULATION VISIT (OUTPATIENT)
Dept: VASCULAR LAB | Facility: MEDICAL CENTER | Age: 63
End: 2018-06-13
Attending: INTERNAL MEDICINE
Payer: MEDICARE

## 2018-06-13 VITALS — SYSTOLIC BLOOD PRESSURE: 118 MMHG | DIASTOLIC BLOOD PRESSURE: 77 MMHG | HEART RATE: 62 BPM | HEIGHT: 68 IN

## 2018-06-13 DIAGNOSIS — G45.9 TRANSIENT CEREBRAL ISCHEMIA, UNSPECIFIED TYPE: ICD-10-CM

## 2018-06-13 DIAGNOSIS — I63.00 CEREBROVASCULAR ACCIDENT (CVA) DUE TO THROMBOSIS OF PRECEREBRAL ARTERY (HCC): ICD-10-CM

## 2018-06-13 DIAGNOSIS — I48.91 ATRIAL FIBRILLATION, UNSPECIFIED TYPE (HCC): ICD-10-CM

## 2018-06-13 DIAGNOSIS — I82.401 ACUTE DEEP VEIN THROMBOSIS (DVT) OF RIGHT LOWER EXTREMITY, UNSPECIFIED VEIN (HCC): ICD-10-CM

## 2018-06-13 DIAGNOSIS — I27.82 CHRONIC SEPTIC PULMONARY EMBOLISM WITH ACUTE COR PULMONALE (HCC): ICD-10-CM

## 2018-06-13 DIAGNOSIS — I26.01 CHRONIC SEPTIC PULMONARY EMBOLISM WITH ACUTE COR PULMONALE (HCC): ICD-10-CM

## 2018-06-13 LAB
INR BLD: 3.1 (ref 0.9–1.2)
INR PPP: 3.1 (ref 2–3.5)

## 2018-06-13 PROCEDURE — 99212 OFFICE O/P EST SF 10 MIN: CPT | Performed by: PHARMACIST

## 2018-06-13 PROCEDURE — 85610 PROTHROMBIN TIME: CPT

## 2018-06-13 RX ORDER — WARFARIN SODIUM 3 MG/1
TABLET ORAL
Qty: 30 TAB | Refills: 5 | Status: SHIPPED | OUTPATIENT
Start: 2018-06-13 | End: 2019-04-08 | Stop reason: SDUPTHER

## 2018-06-13 RX ORDER — WARFARIN SODIUM 6 MG/1
6 TABLET ORAL DAILY
Qty: 90 TAB | Refills: 3 | Status: SHIPPED | OUTPATIENT
Start: 2018-06-13 | End: 2019-04-08

## 2018-06-13 NOTE — PROGRESS NOTES
Anticoagulation Summary  As of 6/13/2018    INR goal:   2.0-3.0   TTR:   84.5 % (2.7 y)   Today's INR:      Warfarin maintenance plan:   6 mg (6 mg x 1) on Mon, Wed, Fri; 3 mg (6 mg x 0.5) all other days   Weekly warfarin total:   30 mg   Plan last modified:   NORMA Flores (8/9/2017)   Next INR check:      Target end date:   Indefinite    Indications    Deep vein thrombosis (DVT) (HCC) [I82.409]  Atrial fibrillation (HCC) [I48.91]  Pulmonary embolism [415.19] [I26.99]  Stroke [434.91] [I63.9]  Transient ischemic attack [435.9] [G45.9]             Anticoagulation Episode Summary     INR check location:       Preferred lab:       Send INR reminders to:       Comments:         Anticoagulation Care Providers     Provider Role Specialty Phone number    Carole Mason M.D. Referring Internal Medicine [phone]    [facility] Anticoagulation Services Responsible  [phone]        Anticoagulation Patient Findings      HPI:  Elena Warren seen in clinic today, on anticoagulation therapy with warfarin for Afib and Hx PE  Changes to current medical/health status since last appt: believes she may have messed up her warfarin pills last week.   Denies signs/symptoms of bleeding and/or thrombosis since the last appt.    Denies any interval changes to diet  Denies any interval changes to medications since last appt.   Denies any complications or cost restrictions with current therapy.   BP recorded in vitals.      A/P   INR  is slightly SUPRA -therapeutic.   Will have pt take a decreased dose of 3mg x1 today and then resume her normal dosing.   Sent warfarin refills to [facility] as per pt request.    Follow up appointment in 4 week(s).    Samira Carlin, PharmD

## 2018-08-22 ENCOUNTER — ANTICOAGULATION VISIT (OUTPATIENT)
Dept: VASCULAR LAB | Facility: MEDICAL CENTER | Age: 63
End: 2018-08-22
Attending: INTERNAL MEDICINE
Payer: MEDICARE

## 2018-08-22 DIAGNOSIS — G45.9 TRANSIENT CEREBRAL ISCHEMIA, UNSPECIFIED TYPE: ICD-10-CM

## 2018-08-22 DIAGNOSIS — I48.91 ATRIAL FIBRILLATION, UNSPECIFIED TYPE (HCC): ICD-10-CM

## 2018-08-22 DIAGNOSIS — I63.9 CEREBROVASCULAR ACCIDENT (CVA), UNSPECIFIED MECHANISM (HCC): ICD-10-CM

## 2018-08-22 DIAGNOSIS — I82.90 DEEP VEIN THROMBOSIS (DVT) OF NON-EXTREMITY VEIN, UNSPECIFIED CHRONICITY: ICD-10-CM

## 2018-08-22 DIAGNOSIS — I26.99 OTHER PULMONARY EMBOLISM WITHOUT ACUTE COR PULMONALE, UNSPECIFIED CHRONICITY (HCC): ICD-10-CM

## 2018-08-22 LAB
INR BLD: 2.3 (ref 0.9–1.2)
INR PPP: 2.3 (ref 2–3.5)

## 2018-08-22 PROCEDURE — 99211 OFF/OP EST MAY X REQ PHY/QHP: CPT | Performed by: NURSE PRACTITIONER

## 2018-08-22 PROCEDURE — 85610 PROTHROMBIN TIME: CPT

## 2018-08-22 NOTE — PROGRESS NOTES
Anticoagulation Summary  As of 8/22/2018    INR goal:   2.0-3.0   TTR:   83.7 % (3.2 y)   Today's INR:   2.3   Warfarin maintenance plan:   6 mg (6 mg x 1) on Mon, Wed, Fri; 3 mg (3 mg x 1) all other days   Weekly warfarin total:   30 mg   No change documented:   Nia Tang, A.P.NYudy   Plan last modified:   Samira Carlin, PharmD (6/13/2018)   Next INR check:   10/17/2018   Target end date:   Indefinite    Indications    Deep vein thrombosis (DVT) (HCC) [I82.409]  Atrial fibrillation (HCC) [I48.91]  Pulmonary embolism [415.19] [I26.99]  Stroke [434.91] [I63.9]  Transient ischemic attack [435.9] [G45.9]             Anticoagulation Episode Summary     INR check location:       Preferred lab:       Send INR reminders to:       Comments:         Anticoagulation Care Providers     Provider Role Specialty Phone number    Carole Mason M.D. Referring Internal Medicine [phone]    [facility] Anticoagulation Services Responsible  [phone]        Anticoagulation Patient Findings      HPI:  Elena POWER Warren seen in clinic today for follow up on anticoagulation therapy in the presence of DVT, AF, PE, CVA/TIA hx. Denies any changes to current medical/health status since last appointment. Denies any medication or diet changes. No current symptoms of bleeding or thrombosis reported.    A/P:   INR therapeutic. Continue current regimen. BP declined after 3 attempts.    Follow up appointment in 6 week(s).    Next Appointment: Pt will call to reschedule next appt in late Sept/early October.    Nia YEN

## 2018-11-19 ENCOUNTER — ANTICOAGULATION VISIT (OUTPATIENT)
Dept: VASCULAR LAB | Facility: MEDICAL CENTER | Age: 63
End: 2018-11-19
Attending: INTERNAL MEDICINE
Payer: MEDICARE

## 2018-11-19 VITALS — SYSTOLIC BLOOD PRESSURE: 127 MMHG | HEART RATE: 60 BPM | DIASTOLIC BLOOD PRESSURE: 80 MMHG

## 2018-11-19 DIAGNOSIS — I26.99 OTHER PULMONARY EMBOLISM WITHOUT ACUTE COR PULMONALE, UNSPECIFIED CHRONICITY (HCC): ICD-10-CM

## 2018-11-19 DIAGNOSIS — G45.9 TRANSIENT ISCHEMIC ATTACK: ICD-10-CM

## 2018-11-19 DIAGNOSIS — I48.91 ATRIAL FIBRILLATION, UNSPECIFIED TYPE (HCC): ICD-10-CM

## 2018-11-19 DIAGNOSIS — I82.90 DEEP VEIN THROMBOSIS (DVT) OF NON-EXTREMITY VEIN, UNSPECIFIED CHRONICITY: ICD-10-CM

## 2018-11-19 DIAGNOSIS — I63.9 CEREBROVASCULAR ACCIDENT (CVA), UNSPECIFIED MECHANISM (HCC): ICD-10-CM

## 2018-11-19 LAB
INR BLD: 2.8 (ref 0.9–1.2)
INR PPP: 2.8 (ref 2–3.5)

## 2018-11-19 PROCEDURE — 90686 IIV4 VACC NO PRSV 0.5 ML IM: CPT | Performed by: NURSE PRACTITIONER

## 2018-11-19 PROCEDURE — 90471 IMMUNIZATION ADMIN: CPT | Performed by: NURSE PRACTITIONER

## 2018-11-19 PROCEDURE — 99211 OFF/OP EST MAY X REQ PHY/QHP: CPT | Mod: 25 | Performed by: NURSE PRACTITIONER

## 2018-11-19 PROCEDURE — 85610 PROTHROMBIN TIME: CPT

## 2018-11-19 NOTE — PROGRESS NOTES
Anticoagulation Summary  As of 11/19/2018    INR goal:   2.0-3.0   TTR:   84.8 % (3.5 y)   Today's INR:   2.8   Warfarin maintenance plan:   6 mg (6 mg x 1) on Mon, Wed, Fri; 3 mg (3 mg x 1) all other days   Weekly warfarin total:   30 mg   Plan last modified:   Samira Carlin, PharmD (6/13/2018)   Next INR check:   2/11/2019   Target end date:   Indefinite    Indications    Deep vein thrombosis (DVT) (HCC) [I82.409]  Atrial fibrillation (HCC) [I48.91]  Pulmonary embolism [415.19] [I26.99]  Stroke [434.91] [I63.9]  Transient ischemic attack [435.9] [G45.9]             Anticoagulation Episode Summary     INR check location:       Preferred lab:       Send INR reminders to:       Comments:         Anticoagulation Care Providers     Provider Role Specialty Phone number    Carole Mason M.D. Referring Internal Medicine [phone]    [facility] Anticoagulation Services Responsible  [phone]        Anticoagulation Patient Findings      HPI:  Elena Warren seen in clinic today for follow up on anticoagulation therapy in the presence of DVT, PE, CVA, AF hx. Denies any changes to current medical/health status since last appointment. Denies any medication or diet changes. No current symptoms of bleeding or thrombosis reported.    A/P:   INR therapeutic. Continue current regimen. BP recorded in vitals.    Pt consented to and received the flu vaccine.    Follow up appointment in 12 week(s).    Next Appointment: Pt will call to make an appt in early Feb 2019.    Nia YEN

## 2018-11-27 DIAGNOSIS — Z86.73 HISTORY OF STROKE: ICD-10-CM

## 2019-04-08 ENCOUNTER — ANTICOAGULATION VISIT (OUTPATIENT)
Dept: VASCULAR LAB | Facility: MEDICAL CENTER | Age: 64
End: 2019-04-08
Attending: PSYCHIATRY & NEUROLOGY
Payer: MEDICARE

## 2019-04-08 DIAGNOSIS — G45.9 TRANSIENT CEREBRAL ISCHEMIA, UNSPECIFIED TYPE: ICD-10-CM

## 2019-04-08 DIAGNOSIS — I82.401 ACUTE DEEP VEIN THROMBOSIS (DVT) OF RIGHT LOWER EXTREMITY, UNSPECIFIED VEIN (HCC): ICD-10-CM

## 2019-04-08 DIAGNOSIS — I63.00 CEREBROVASCULAR ACCIDENT (CVA) DUE TO THROMBOSIS OF PRECEREBRAL ARTERY (HCC): ICD-10-CM

## 2019-04-08 DIAGNOSIS — I27.82 CHRONIC SEPTIC PULMONARY EMBOLISM WITH ACUTE COR PULMONALE (HCC): ICD-10-CM

## 2019-04-08 DIAGNOSIS — G45.9 TRANSIENT ISCHEMIC ATTACK: ICD-10-CM

## 2019-04-08 DIAGNOSIS — I26.01 CHRONIC SEPTIC PULMONARY EMBOLISM WITH ACUTE COR PULMONALE (HCC): ICD-10-CM

## 2019-04-08 DIAGNOSIS — I48.91 ATRIAL FIBRILLATION, UNSPECIFIED TYPE (HCC): ICD-10-CM

## 2019-04-08 DIAGNOSIS — I48.20 CHRONIC ATRIAL FIBRILLATION (HCC): ICD-10-CM

## 2019-04-08 LAB — INR PPP: 2.2 (ref 2–3.5)

## 2019-04-08 PROCEDURE — 85610 PROTHROMBIN TIME: CPT

## 2019-04-08 PROCEDURE — 99211 OFF/OP EST MAY X REQ PHY/QHP: CPT

## 2019-04-08 RX ORDER — WARFARIN SODIUM 3 MG/1
TABLET ORAL
Qty: 30 TAB | Refills: 5 | Status: SHIPPED | OUTPATIENT
Start: 2019-04-08 | End: 2019-09-27 | Stop reason: SDUPTHER

## 2019-04-08 RX ORDER — WARFARIN SODIUM 6 MG/1
TABLET ORAL
Qty: 90 TAB | Refills: 0 | Status: SHIPPED | OUTPATIENT
Start: 2019-04-08 | End: 2019-09-27 | Stop reason: SDUPTHER

## 2019-04-08 NOTE — PROGRESS NOTES
Anticoagulation Summary  As of 2019    INR goal:   2.0-3.0   TTR:   86.3 % (3.9 y)   INR used for dosin.2 (2019)   Warfarin maintenance plan:   6 mg (6 mg x 1) every Mon, Wed, Fri; 3 mg (3 mg x 1) all other days   Weekly warfarin total:   30 mg   Plan last modified:   Samira Carlin PharmD (2018)   Next INR check:   2019   Target end date:   Indefinite    Indications    Deep vein thrombosis (DVT) (HCC) [I82.409]  Atrial fibrillation (HCC) [I48.91]  Pulmonary embolism [415.19] [I26.99]  Stroke [434.91] [I63.9]  Transient ischemic attack [435.9] [G45.9]             Anticoagulation Episode Summary     INR check location:       Preferred lab:       Send INR reminders to:       Comments:         Anticoagulation Care Providers     Provider Role Specialty Phone number    Carole Mason M.D. Referring Internal Medicine [phone]    [facility] Anticoagulation Services Responsible  [phone]        Anticoagulation Patient Findings      HPI:  Elena Warren seen in clinic today, on anticoagulation therapy with warfarin for DVT  Changes to current medical/health status since last appt: none  Denies signs/symptoms of bleeding and/or thrombosis since the last appt.    Denies any interval changes to diet  Denies any interval changes to medications since last appt.   Denies any complications or cost restrictions with current therapy.   Pt refused      A/P   INR  therapeutic.   Instructed pt to continue current dose    Follow up appointment in 12 week(s).    Mimi Russell, PharmD

## 2019-04-10 LAB — INR BLD: 2.2 (ref 0.9–1.2)

## 2019-09-27 ENCOUNTER — ANTICOAGULATION VISIT (OUTPATIENT)
Dept: VASCULAR LAB | Facility: MEDICAL CENTER | Age: 64
End: 2019-09-27
Attending: INTERNAL MEDICINE
Payer: MEDICARE

## 2019-09-27 DIAGNOSIS — I26.01 CHRONIC SEPTIC PULMONARY EMBOLISM WITH ACUTE COR PULMONALE (HCC): ICD-10-CM

## 2019-09-27 DIAGNOSIS — G45.9 TRANSIENT CEREBRAL ISCHEMIA, UNSPECIFIED TYPE: ICD-10-CM

## 2019-09-27 DIAGNOSIS — G45.9 TRANSIENT ISCHEMIC ATTACK: ICD-10-CM

## 2019-09-27 DIAGNOSIS — I48.91 ATRIAL FIBRILLATION, UNSPECIFIED TYPE (HCC): ICD-10-CM

## 2019-09-27 DIAGNOSIS — I27.82 CHRONIC SEPTIC PULMONARY EMBOLISM WITH ACUTE COR PULMONALE (HCC): ICD-10-CM

## 2019-09-27 DIAGNOSIS — I48.20 CHRONIC ATRIAL FIBRILLATION (HCC): ICD-10-CM

## 2019-09-27 DIAGNOSIS — I63.00 CEREBROVASCULAR ACCIDENT (CVA) DUE TO THROMBOSIS OF PRECEREBRAL ARTERY (HCC): ICD-10-CM

## 2019-09-27 DIAGNOSIS — I82.401 ACUTE DEEP VEIN THROMBOSIS (DVT) OF RIGHT LOWER EXTREMITY, UNSPECIFIED VEIN (HCC): ICD-10-CM

## 2019-09-27 LAB — INR PPP: 2.3 (ref 2–3.5)

## 2019-09-27 PROCEDURE — 85610 PROTHROMBIN TIME: CPT

## 2019-09-27 PROCEDURE — 99211 OFF/OP EST MAY X REQ PHY/QHP: CPT | Performed by: PHARMACIST

## 2019-09-27 RX ORDER — WARFARIN SODIUM 3 MG/1
TABLET ORAL
Qty: 50 TAB | Refills: 5 | Status: SHIPPED | OUTPATIENT
Start: 2019-09-27 | End: 2020-10-26

## 2019-09-27 RX ORDER — WARFARIN SODIUM 6 MG/1
TABLET ORAL
Qty: 90 TAB | Refills: 2 | Status: SHIPPED | OUTPATIENT
Start: 2019-09-27 | End: 2020-10-26

## 2019-09-27 NOTE — PROGRESS NOTES
Anticoagulation Summary  As of 2019    INR goal:   2.0-3.0   TTR:   87.8 % (4.3 y)   INR used for dosin.30 (2019)   Warfarin maintenance plan:   6 mg (6 mg x 1) every Mon, Wed, Fri; 3 mg (3 mg x 1) all other days   Weekly warfarin total:   30 mg   Plan last modified:   Samira Carlin, PharmD (2018)   Next INR check:   2019   Target end date:   Indefinite    Indications    Deep vein thrombosis (DVT) (HCC) [I82.409]  Atrial fibrillation (HCC) [I48.91]  Pulmonary embolism [415.19] [I26.99]  Stroke [434.91] [I63.9]  Transient ischemic attack [435.9] [G45.9]             Anticoagulation Episode Summary     INR check location:       Preferred lab:       Send INR reminders to:       Comments:         Anticoagulation Care Providers     Provider Role Specialty Phone number    Carole Mason M.D. Referring Internal Medicine [phone]    [facility] Anticoagulation Services Responsible  [phone]                Anticoagulation Patient Findings      HPI:  Elena Warren seen in clinic today, on anticoagulation therapy with warfarin for Afib, Hx stroke, VTE  Changes to current medical/health status since last appt: denies  Denies signs/symptoms of bleeding and/or thrombosis since the last appt.    Denies any interval changes to diet  Denies any interval changes to medications since last appt.   Denies any complications or cost restrictions with current therapy.   BP declined      A/P   INR  is-therapeutic.   Will have pt continue with the same warfarin dosing    Follow up appointment in 8 week(s) but patient declined to make f/u appt. States that she will call.     Samira Carlin, PharmD

## 2019-11-15 DIAGNOSIS — Z79.01 CHRONIC ANTICOAGULATION: ICD-10-CM

## 2019-12-27 ENCOUNTER — TELEPHONE (OUTPATIENT)
Dept: VASCULAR LAB | Facility: MEDICAL CENTER | Age: 64
End: 2019-12-27

## 2019-12-27 NOTE — TELEPHONE ENCOUNTER
Left msg for INR reminder.     INR   Date Value Ref Range Status   09/27/2019 2.30  Final       Samira Carlin, JuanD

## 2020-01-06 ENCOUNTER — ANTICOAGULATION VISIT (OUTPATIENT)
Dept: VASCULAR LAB | Facility: MEDICAL CENTER | Age: 65
End: 2020-01-06
Attending: INTERNAL MEDICINE
Payer: MEDICARE

## 2020-01-06 DIAGNOSIS — G45.9 TRANSIENT ISCHEMIC ATTACK: ICD-10-CM

## 2020-01-06 LAB
INR BLD: 2.6 (ref 0.9–1.2)
INR PPP: 2.6 (ref 2–3.5)

## 2020-01-06 PROCEDURE — 90662 IIV NO PRSV INCREASED AG IM: CPT

## 2020-01-06 PROCEDURE — 85610 PROTHROMBIN TIME: CPT

## 2020-01-06 PROCEDURE — 99211 OFF/OP EST MAY X REQ PHY/QHP: CPT | Mod: 25

## 2020-01-06 NOTE — PROGRESS NOTES
Anticoagulation Summary  As of 2020    INR goal:   2.0-3.0   TTR:   88.5 % (4.6 y)   INR used for dosin.60 (2020)   Warfarin maintenance plan:   6 mg (6 mg x 1) every Mon, Wed, Fri; 3 mg (3 mg x 1) all other days   Weekly warfarin total:   30 mg   Plan last modified:   Samira Carlin, PharmD (2018)   Next INR check:   2020   Target end date:   Indefinite    Indications    Deep vein thrombosis (DVT) (HCC) [I82.409]  Atrial fibrillation (HCC) [I48.91]  Pulmonary embolism [415.19] [I26.99]  Stroke [434.91] [I63.9]  Transient ischemic attack [435.9] [G45.9]             Anticoagulation Episode Summary     INR check location:       Preferred lab:       Send INR reminders to:       Comments:         Anticoagulation Care Providers     Provider Role Specialty Phone number    Carole Mason M.D. Referring Internal Medicine [phone]    [facility] Anticoagulation Services Responsible  [phone]        Anticoagulation Patient Findings    HPI:  Elena Ortega Warren seen in clinic today, on anticoagulation therapy with warfarin for Afib.  Changes to current medical/health status since last appt: none  Denies signs/symptoms of bleeding and/or thrombosis since the last appt.    Denies any interval changes to diet  Denies any interval changes to medications since last appt.   Denies any complications or cost restrictions with current therapy.     Patient consented for an influenza vaccine. Administered to the right deltoid.    A/P   INR is therapeutic at 2.6  Pt to continue current warfarin therapy regimen.   Pt to contact clinic for any s/s of unusual bleeding, bruising, clotting, or any changes to diet or medication.     Follow up appointment in 6 week(s).    Maddie Ding, PharmD     2020 Addendum: charges reviewed    Danica Torres, Clinical Pharmacist, CDE, CACP

## 2020-02-18 ENCOUNTER — APPOINTMENT (OUTPATIENT)
Dept: VASCULAR LAB | Facility: MEDICAL CENTER | Age: 65
End: 2020-02-18
Attending: INTERNAL MEDICINE
Payer: MEDICARE

## 2020-02-21 ENCOUNTER — ANTICOAGULATION VISIT (OUTPATIENT)
Dept: VASCULAR LAB | Facility: MEDICAL CENTER | Age: 65
End: 2020-02-21
Attending: INTERNAL MEDICINE
Payer: MEDICARE

## 2020-02-21 DIAGNOSIS — G45.9 TRANSIENT ISCHEMIC ATTACK: ICD-10-CM

## 2020-02-21 LAB
INR BLD: 3.4 (ref 0.9–1.2)
INR PPP: 3.4 (ref 2–3.5)

## 2020-02-21 PROCEDURE — 85610 PROTHROMBIN TIME: CPT

## 2020-02-21 PROCEDURE — 99212 OFFICE O/P EST SF 10 MIN: CPT

## 2020-02-21 NOTE — PROGRESS NOTES
Anticoagulation Summary  As of 2/21/2020    INR goal:   2.0-3.0   TTR:   87.5 % (4.7 y)   INR used for dosing:   3.40! (2/21/2020)   Warfarin maintenance plan:   6 mg (6 mg x 1) every Mon, Wed, Fri; 3 mg (3 mg x 1) all other days   Weekly warfarin total:   30 mg   Plan last modified:   Samira Carlin, PharmD (6/13/2018)   Next INR check:   3/20/2020   Target end date:   Indefinite    Indications    Deep vein thrombosis (DVT) (HCC) [I82.409]  Atrial fibrillation (HCC) [I48.91]  Pulmonary embolism [415.19] [I26.99]  Stroke [434.91] [I63.9]  Transient ischemic attack [435.9] [G45.9]             Anticoagulation Episode Summary     INR check location:       Preferred lab:       Send INR reminders to:       Comments:         Anticoagulation Care Providers     Provider Role Specialty Phone number    Carole Mason M.D. Referring Internal Medicine [phone]    [facility] Anticoagulation Services Responsible  [phone]        Anticoagulation Patient Findings      HPI:  Elena Warren seen in clinic today for follow up on anticoagulation therapy in the presence of DVT, PE and Stroke.   Denies any changes to current medical/health status since last appointment.   Denies any medication or diet changes.   No current symptoms of bleeding or thrombosis reported.    A/P:   INR is supra therapeutic at 3.4.   Patient is to take decreased dose of 3 mg tonight then continue current regimen.     Patient requesting Pneumonia vaccine     Follow up appointment in 4 week(s).    Next Appointment: 03/20/2020,

## 2020-03-20 ENCOUNTER — APPOINTMENT (OUTPATIENT)
Dept: VASCULAR LAB | Facility: MEDICAL CENTER | Age: 65
End: 2020-03-20
Attending: INTERNAL MEDICINE
Payer: MEDICARE

## 2020-03-23 ENCOUNTER — APPOINTMENT (OUTPATIENT)
Dept: VASCULAR LAB | Facility: MEDICAL CENTER | Age: 65
End: 2020-03-23
Attending: INTERNAL MEDICINE
Payer: MEDICARE

## 2020-03-26 ENCOUNTER — ANTICOAGULATION VISIT (OUTPATIENT)
Dept: VASCULAR LAB | Facility: MEDICAL CENTER | Age: 65
End: 2020-03-26
Attending: INTERNAL MEDICINE
Payer: MEDICARE

## 2020-03-26 DIAGNOSIS — G45.9 TRANSIENT ISCHEMIC ATTACK: ICD-10-CM

## 2020-03-26 LAB — INR PPP: 2.9 (ref 2–3.5)

## 2020-03-26 PROCEDURE — 99211 OFF/OP EST MAY X REQ PHY/QHP: CPT

## 2020-03-26 PROCEDURE — 85610 PROTHROMBIN TIME: CPT

## 2020-03-26 NOTE — PROGRESS NOTES
Anticoagulation Summary  As of 3/26/2020    INR goal:   2.0-3.0   TTR:   86.2 % (4.8 y)   INR used for dosin.90 (3/26/2020)   Warfarin maintenance plan:   6 mg (6 mg x 1) every Mon, Wed, Fri; 3 mg (3 mg x 1) all other days   Weekly warfarin total:   30 mg   Plan last modified:   Juan NavarroD (2018)   Next INR check:   2020   Target end date:   Indefinite    Indications    Deep vein thrombosis (DVT) (HCC) [I82.409]  Atrial fibrillation (HCC) [I48.91]  Pulmonary embolism [415.19] [I26.99]  Stroke [434.91] [I63.9]  Transient ischemic attack [435.9] [G45.9]             Anticoagulation Episode Summary     INR check location:       Preferred lab:       Send INR reminders to:       Comments:         Anticoagulation Care Providers     Provider Role Specialty Phone number    Carole Mason M.D. Referring Internal Medicine [phone]    [facility] Anticoagulation Services Responsible  [phone]        Anticoagulation Patient Findings      HPI:  Elena Warren seen in clinic today, on anticoagulation therapy with warfarin for DVT.   Changes to current medical/health status since last appt: none  Denies signs/symptoms of bleeding and/or thrombosis since the last appt.    Denies any interval changes to diet  Denies any interval changes to medications since last appt.   Denies any complications or cost restrictions with current therapy.   Confirmed dosing regimen.     A/P   INR  therapeutic.   Confirmed dosing regimen.     Follow up appointment in 6 week(s).    Shamar Noble, JuanD

## 2020-03-27 LAB — INR BLD: 2.9 (ref 0.9–1.2)

## 2020-05-07 ENCOUNTER — APPOINTMENT (OUTPATIENT)
Dept: VASCULAR LAB | Facility: MEDICAL CENTER | Age: 65
End: 2020-05-07
Attending: INTERNAL MEDICINE
Payer: MEDICARE

## 2020-05-21 ENCOUNTER — TELEPHONE (OUTPATIENT)
Dept: VASCULAR LAB | Facility: MEDICAL CENTER | Age: 65
End: 2020-05-21

## 2020-06-11 ENCOUNTER — TELEPHONE (OUTPATIENT)
Dept: VASCULAR LAB | Facility: MEDICAL CENTER | Age: 65
End: 2020-06-11

## 2020-06-11 NOTE — TELEPHONE ENCOUNTER
Unable to leave , will send letter.     INR   Date Value Ref Range Status   03/26/2020 2.90  Bre Carlin, JaunD

## 2020-06-11 NOTE — LETTER
June 11, 2020    Elena Warren  [address]  [place] [address]      Dear Elena Warren    We have been unsuccessful in our attempts to contact you regarding your Anticoagulation Service appointments.  Warfarin is a potent blood-thinning agent that requires frequent monitoring to measure its level in the blood.  If your level becomes too high, you could develop serious, sometimes life-threatening bleeding problems.  If your level becomes too low, life-threatening blood clots or stroke could result.     The last recorded INR that we have on file for you is:  INR   Date Value Ref Range Status   03/26/2020 2.90  Final     It is extremely important that you have your lab work drawn as soon as possible.  Alternatively, you may schedule an appointment for a fingerstick INR at our clinic.  We are open Monday-Friday 8 am until 5 pm.  You may reach our Service at [phone].     To monitor your warfarin level effectively, we need to be able to communicate with you.  This is a requirement to be followed by our Service.  If we are unable to contact you on three separate occasions, you will be discharged from the Anticoagulation Service.     If you repeatedly fail to keep your lab appointments, you are at risk of being discharged from the Service.           Sincerely,     Yeison Amador, PharmD, [facility]  Pharmacy Clinical Supervisor  [facility]  Outpatient Anticoagulation Service

## 2020-06-18 ENCOUNTER — APPOINTMENT (OUTPATIENT)
Dept: VASCULAR LAB | Facility: MEDICAL CENTER | Age: 65
End: 2020-06-18
Attending: INTERNAL MEDICINE
Payer: MEDICARE

## 2020-06-19 ENCOUNTER — ANTICOAGULATION VISIT (OUTPATIENT)
Dept: VASCULAR LAB | Facility: MEDICAL CENTER | Age: 65
End: 2020-06-19
Attending: INTERNAL MEDICINE
Payer: MEDICARE

## 2020-06-19 DIAGNOSIS — G45.9 TRANSIENT ISCHEMIC ATTACK: ICD-10-CM

## 2020-06-19 LAB — INR PPP: 2.5 (ref 2–3.5)

## 2020-06-19 PROCEDURE — 99211 OFF/OP EST MAY X REQ PHY/QHP: CPT | Performed by: PHARMACIST

## 2020-06-19 PROCEDURE — 85610 PROTHROMBIN TIME: CPT

## 2020-06-19 NOTE — PROGRESS NOTES
Anticoagulation Summary  As of 2020    INR goal:   2.0-3.0   TTR:   86.9 % (5.1 y)   INR used for dosin.50 (2020)   Warfarin maintenance plan:   6 mg (6 mg x 1) every Mon, Wed, Fri; 3 mg (3 mg x 1) all other days   Weekly warfarin total:   30 mg   Plan last modified:   Samira Carlin PharmD (2018)   Next INR check:   2020   Target end date:   Indefinite    Indications    Deep vein thrombosis (DVT) (HCC) [I82.409]  Atrial fibrillation (HCC) [I48.91]  Pulmonary embolism [415.19] [I26.99]  Stroke [434.91] [I63.9]  Transient ischemic attack [435.9] [G45.9]             Anticoagulation Episode Summary     INR check location:       Preferred lab:       Send INR reminders to:       Comments:         Anticoagulation Care Providers     Provider Role Specialty Phone number    Carole Mason M.D. Referring Internal Medicine [phone]    [facility] Anticoagulation Services Responsible  [phone]                Anticoagulation Patient Findings      HPI:  Elena Warren seen in clinic today, on anticoagulation therapy with warfarin for PE  Changes to current medical/health status since last appt: denies  Denies signs/symptoms of bleeding and/or thrombosis since the last appt.    Denies any interval changes to diet  Denies any interval changes to medications since last appt.   Denies any complications or cost restrictions with current therapy.   Verified current warfarin dosing schedule.       A/P   INR  is-therapeutic.   Will continue with the same warfarin dosing.     Follow up appointment in 12 week(s).    Samira Carlin, PharmD

## 2020-06-24 LAB — INR BLD: 2.5 (ref 0.9–1.2)

## 2020-09-11 ENCOUNTER — APPOINTMENT (OUTPATIENT)
Dept: VASCULAR LAB | Facility: MEDICAL CENTER | Age: 65
End: 2020-09-11
Payer: MEDICARE

## 2020-09-16 ENCOUNTER — TELEPHONE (OUTPATIENT)
Dept: VASCULAR LAB | Facility: MEDICAL CENTER | Age: 65
End: 2020-09-16

## 2020-09-16 NOTE — TELEPHONE ENCOUNTER
[facility]    Left a voicemail to remind pt to obtain next INR.     Shamar Noble, PharmD

## 2020-09-21 ENCOUNTER — APPOINTMENT (OUTPATIENT)
Dept: VASCULAR LAB | Facility: MEDICAL CENTER | Age: 65
End: 2020-09-21
Payer: MEDICARE

## 2020-09-25 ENCOUNTER — ANTICOAGULATION VISIT (OUTPATIENT)
Dept: VASCULAR LAB | Facility: MEDICAL CENTER | Age: 65
End: 2020-09-25
Attending: INTERNAL MEDICINE
Payer: MEDICARE

## 2020-09-25 DIAGNOSIS — Z23 NEED FOR VACCINATION: ICD-10-CM

## 2020-09-25 DIAGNOSIS — G45.9 TRANSIENT ISCHEMIC ATTACK: ICD-10-CM

## 2020-09-25 LAB — INR PPP: 2.6 (ref 2–3.5)

## 2020-09-25 PROCEDURE — 99211 OFF/OP EST MAY X REQ PHY/QHP: CPT | Mod: 25

## 2020-09-25 PROCEDURE — 85610 PROTHROMBIN TIME: CPT

## 2020-09-25 PROCEDURE — 90662 IIV NO PRSV INCREASED AG IM: CPT

## 2020-09-25 NOTE — PROGRESS NOTES
Anticoagulation Summary  As of 2020    INR goal:  2.0-3.0   TTR:  87.5 % (5.3 y)   INR used for dosin.60 (2020)   Warfarin maintenance plan:  6 mg (6 mg x 1) every Mon, Wed, Fri; 3 mg (3 mg x 1) all other days   Weekly warfarin total:  30 mg   Plan last modified:  Juan HassanD (2018)   Next INR check:  2020   Target end date:  Indefinite    Indications    Deep vein thrombosis (DVT) (HCC) [I82.409]  Atrial fibrillation (HCC) [I48.91]  Pulmonary embolism [415.19] [I26.99]  Stroke [434.91] [I63.9]  Transient ischemic attack [435.9] [G45.9]             Anticoagulation Episode Summary     INR check location:      Preferred lab:      Send INR reminders to:      Comments:        Anticoagulation Care Providers     Provider Role Specialty Phone number    Carole Mason M.D. Referring Internal Medicine [phone]    [facility] Anticoagulation Services Responsible  [phone]        Anticoagulation Patient Findings      HPI:  Elena Warren seen in clinic today, on anticoagulation therapy with warfarin for TIA.   Changes to current medical/health status since last appt: none  Denies signs/symptoms of bleeding and/or thrombosis since the last appt.    Denies any interval changes to diet  Denies any interval changes to medications since last appt.   Denies any complications or cost restrictions with current therapy.   Declines vitals.  Pt consented to receiving immunization today.   Confirmed dosing regimen.     A/P   INR  therapeutic.   Pt is to continue with current warfarin dosing regimen.     Follow up appointment in 12 week(s).    Shamar Noble, PharmD

## 2020-09-28 LAB — INR BLD: 2.6 (ref 0.9–1.2)

## 2020-10-24 DIAGNOSIS — I82.401 ACUTE DEEP VEIN THROMBOSIS (DVT) OF RIGHT LOWER EXTREMITY, UNSPECIFIED VEIN (HCC): ICD-10-CM

## 2020-10-24 DIAGNOSIS — G45.9 TRANSIENT CEREBRAL ISCHEMIA, UNSPECIFIED TYPE: ICD-10-CM

## 2020-10-24 DIAGNOSIS — I26.01 CHRONIC SEPTIC PULMONARY EMBOLISM WITH ACUTE COR PULMONALE (HCC): ICD-10-CM

## 2020-10-24 DIAGNOSIS — I48.91 ATRIAL FIBRILLATION, UNSPECIFIED TYPE (HCC): ICD-10-CM

## 2020-10-24 DIAGNOSIS — G45.9 TRANSIENT ISCHEMIC ATTACK: ICD-10-CM

## 2020-10-24 DIAGNOSIS — I63.00 CEREBROVASCULAR ACCIDENT (CVA) DUE TO THROMBOSIS OF PRECEREBRAL ARTERY (HCC): ICD-10-CM

## 2020-10-24 DIAGNOSIS — I48.20 CHRONIC ATRIAL FIBRILLATION (HCC): ICD-10-CM

## 2020-10-24 DIAGNOSIS — I27.82 CHRONIC SEPTIC PULMONARY EMBOLISM WITH ACUTE COR PULMONALE (HCC): ICD-10-CM

## 2020-10-26 RX ORDER — WARFARIN SODIUM 3 MG/1
TABLET ORAL
Qty: 50 TAB | Refills: 0 | Status: SHIPPED | OUTPATIENT
Start: 2020-10-26 | End: 2021-02-01

## 2020-10-26 RX ORDER — WARFARIN SODIUM 6 MG/1
TABLET ORAL
Qty: 90 TAB | Refills: 0 | Status: SHIPPED | OUTPATIENT
Start: 2020-10-26 | End: 2021-02-01

## 2020-11-10 DIAGNOSIS — Z79.01 CHRONIC ANTICOAGULATION: ICD-10-CM

## 2021-01-05 ENCOUNTER — TELEPHONE (OUTPATIENT)
Dept: VASCULAR LAB | Facility: MEDICAL CENTER | Age: 66
End: 2021-01-05

## 2021-01-12 ENCOUNTER — ANTICOAGULATION VISIT (OUTPATIENT)
Dept: VASCULAR LAB | Facility: MEDICAL CENTER | Age: 66
End: 2021-01-12
Attending: INTERNAL MEDICINE
Payer: MEDICARE

## 2021-01-12 DIAGNOSIS — G45.9 TRANSIENT ISCHEMIC ATTACK: ICD-10-CM

## 2021-01-12 LAB — INR PPP: 2.1 (ref 2–3.5)

## 2021-01-12 PROCEDURE — 85610 PROTHROMBIN TIME: CPT

## 2021-01-12 PROCEDURE — 99211 OFF/OP EST MAY X REQ PHY/QHP: CPT

## 2021-01-12 NOTE — PROGRESS NOTES
Anticoagulation Summary  As of 2021    INR goal:  2.0-3.0   TTR:  88.2 % (5.6 y)   INR used for dosin.10 (2021)   Warfarin maintenance plan:  6 mg (6 mg x 1) every Mon, Wed, Fri; 3 mg (3 mg x 1) all other days   Weekly warfarin total:  30 mg   Plan last modified:  Samira Carlin PharmD (2018)   Next INR check:  2021   Target end date:  Indefinite    Indications    Deep vein thrombosis (DVT) (HCC) [I82.409]  Atrial fibrillation (HCC) [I48.91]  Pulmonary embolism [415.19] [I26.99]  Stroke [434.91] [I63.9]  Transient ischemic attack [435.9] [G45.9]             Anticoagulation Episode Summary     INR check location:      Preferred lab:      Send INR reminders to:      Comments:        Anticoagulation Care Providers     Provider Role Specialty Phone number    Carole Mason M.D. Referring Internal Medicine [phone]    [facility] Anticoagulation Services Responsible  [phone]        Anticoagulation Patient Findings      HPI:  Elena Warren seen in clinic today, on anticoagulation therapy with warfarin for A-fib and hx of DVT, PE, & TIA  Changes to current medical/health status since last appt: None  Denies signs/symptoms of bleeding and/or thrombosis since the last appt.    Denies any interval changes to diet.  Denies any interval changes to medications since last appt.   Denies any complications or cost restrictions with current therapy.   BP declined.    A/P   INR  2.1-therapeutic.   Pt instructed to continue with current regimen.    Follow up appointment in 12 week(s).    Tramaine Sheikh, Pharmacy Intern    Kenny Vuong, PharmD

## 2021-01-13 LAB — INR BLD: 2.1 (ref 0.9–1.2)

## 2021-01-30 DIAGNOSIS — G45.9 TRANSIENT CEREBRAL ISCHEMIA, UNSPECIFIED TYPE: ICD-10-CM

## 2021-01-30 DIAGNOSIS — G45.9 TRANSIENT ISCHEMIC ATTACK: ICD-10-CM

## 2021-01-30 DIAGNOSIS — I26.01 CHRONIC SEPTIC PULMONARY EMBOLISM WITH ACUTE COR PULMONALE (HCC): ICD-10-CM

## 2021-01-30 DIAGNOSIS — I48.20 CHRONIC ATRIAL FIBRILLATION (HCC): ICD-10-CM

## 2021-01-30 DIAGNOSIS — I82.401 ACUTE DEEP VEIN THROMBOSIS (DVT) OF RIGHT LOWER EXTREMITY, UNSPECIFIED VEIN (HCC): ICD-10-CM

## 2021-01-30 DIAGNOSIS — I27.82 CHRONIC SEPTIC PULMONARY EMBOLISM WITH ACUTE COR PULMONALE (HCC): ICD-10-CM

## 2021-01-30 DIAGNOSIS — I48.91 ATRIAL FIBRILLATION, UNSPECIFIED TYPE (HCC): ICD-10-CM

## 2021-01-30 DIAGNOSIS — I63.00 CEREBROVASCULAR ACCIDENT (CVA) DUE TO THROMBOSIS OF PRECEREBRAL ARTERY (HCC): ICD-10-CM

## 2021-02-01 RX ORDER — WARFARIN SODIUM 6 MG/1
TABLET ORAL
Qty: 90 TAB | Refills: 0 | Status: SHIPPED | OUTPATIENT
Start: 2021-02-01

## 2021-02-01 RX ORDER — WARFARIN SODIUM 3 MG/1
TABLET ORAL
Qty: 50 TAB | Refills: 0 | Status: SHIPPED | OUTPATIENT
Start: 2021-02-01 | End: 2021-04-29 | Stop reason: SDUPTHER

## 2021-04-06 ENCOUNTER — APPOINTMENT (OUTPATIENT)
Dept: VASCULAR LAB | Facility: MEDICAL CENTER | Age: 66
End: 2021-04-06
Payer: MEDICARE

## 2021-04-22 ENCOUNTER — TELEPHONE (OUTPATIENT)
Dept: VASCULAR LAB | Facility: MEDICAL CENTER | Age: 66
End: 2021-04-22

## 2021-04-22 NOTE — TELEPHONE ENCOUNTER
[facility] [place] [facility]      Pt is over due for PT/INR for warfarin monitoring. Left message for patient to have INR checked ASAP.   Clinic phone number left for any questions or concerns.    Shari Mejia  PharmD

## 2021-04-23 ENCOUNTER — APPOINTMENT (OUTPATIENT)
Dept: VASCULAR LAB | Facility: MEDICAL CENTER | Age: 66
End: 2021-04-23
Attending: INTERNAL MEDICINE
Payer: MEDICARE

## 2021-04-26 ENCOUNTER — APPOINTMENT (OUTPATIENT)
Dept: VASCULAR LAB | Facility: MEDICAL CENTER | Age: 66
End: 2021-04-26
Payer: MEDICARE

## 2021-04-29 ENCOUNTER — ANTICOAGULATION VISIT (OUTPATIENT)
Dept: VASCULAR LAB | Facility: MEDICAL CENTER | Age: 66
End: 2021-04-29
Attending: INTERNAL MEDICINE
Payer: MEDICARE

## 2021-04-29 DIAGNOSIS — I27.82 CHRONIC SEPTIC PULMONARY EMBOLISM WITH ACUTE COR PULMONALE (HCC): ICD-10-CM

## 2021-04-29 DIAGNOSIS — I48.20 CHRONIC ATRIAL FIBRILLATION (HCC): ICD-10-CM

## 2021-04-29 DIAGNOSIS — I63.00 CEREBROVASCULAR ACCIDENT (CVA) DUE TO THROMBOSIS OF PRECEREBRAL ARTERY (HCC): ICD-10-CM

## 2021-04-29 DIAGNOSIS — I63.449 CEREBROVASCULAR ACCIDENT (CVA) DUE TO EMBOLISM OF CEREBELLAR ARTERY, UNSPECIFIED BLOOD VESSEL LATERALITY (HCC): ICD-10-CM

## 2021-04-29 DIAGNOSIS — I82.4Y9 DEEP VEIN THROMBOSIS (DVT) OF PROXIMAL LOWER EXTREMITY, UNSPECIFIED CHRONICITY, UNSPECIFIED LATERALITY (HCC): ICD-10-CM

## 2021-04-29 DIAGNOSIS — I26.01 CHRONIC SEPTIC PULMONARY EMBOLISM WITH ACUTE COR PULMONALE (HCC): ICD-10-CM

## 2021-04-29 DIAGNOSIS — I26.99 OTHER PULMONARY EMBOLISM WITHOUT ACUTE COR PULMONALE, UNSPECIFIED CHRONICITY (HCC): ICD-10-CM

## 2021-04-29 DIAGNOSIS — G45.9 TRANSIENT CEREBRAL ISCHEMIA, UNSPECIFIED TYPE: ICD-10-CM

## 2021-04-29 DIAGNOSIS — I82.401 ACUTE DEEP VEIN THROMBOSIS (DVT) OF RIGHT LOWER EXTREMITY, UNSPECIFIED VEIN (HCC): ICD-10-CM

## 2021-04-29 DIAGNOSIS — G45.9 TRANSIENT ISCHEMIC ATTACK: ICD-10-CM

## 2021-04-29 DIAGNOSIS — I48.91 ATRIAL FIBRILLATION, UNSPECIFIED TYPE (HCC): ICD-10-CM

## 2021-04-29 LAB — INR PPP: 2.9 (ref 2–3.5)

## 2021-04-29 PROCEDURE — 85610 PROTHROMBIN TIME: CPT

## 2021-04-29 PROCEDURE — 99211 OFF/OP EST MAY X REQ PHY/QHP: CPT | Performed by: NURSE PRACTITIONER

## 2021-04-29 RX ORDER — WARFARIN SODIUM 3 MG/1
TABLET ORAL
Qty: 60 TABLET | Refills: 1 | Status: SHIPPED | OUTPATIENT
Start: 2021-04-29 | End: 2021-11-08

## 2021-04-29 NOTE — PROGRESS NOTES
Anticoagulation Summary  As of 2021    INR goal:  2.0-3.0   TTR:  88.8 % (5.9 y)   INR used for dosin.90 (2021)   Warfarin maintenance plan:  6 mg (6 mg x 1) every Mon, Wed, Fri; 3 mg (3 mg x 1) all other days   Weekly warfarin total:  30 mg   No change documented:  Nia Tang, A.P.N.   Plan last modified:  Samira Carlin, PharmD (2018)   Next INR check:  2021   Target end date:  Indefinite    Indications    Deep vein thrombosis (DVT) (HCC) [I82.409]  Atrial fibrillation (HCC) [I48.91]  Pulmonary embolism [415.19] [I26.99]  Stroke [434.91] [I63.9]  Transient ischemic attack [435.9] [G45.9]             Anticoagulation Episode Summary     INR check location:      Preferred lab:      Send INR reminders to:      Comments:        Anticoagulation Care Providers     Provider Role Specialty Phone number    Carole Mason M.D. Referring Internal Medicine [phone]    [facility] Anticoagulation Services Responsible  [phone]        Anticoagulation Patient Findings      HPI:  Elena Ortega Warren seen in clinic today for follow up on anticoagulation therapy in the presence of DVT, PE, AF, CVA/TIA.   Denies any changes to current medical/health status since last appointment.   Denies any medication or diet changes.   No current symptoms of bleeding or thrombosis reported.    Pt is not on antiplatelet therapy.    A/P:   INR therapeutic.   Continue current regimen.   BP declined by pt.    Pt educated to contact our clinic with any changes in medications or s/s of bleeding or thrombosis. Pt is aware to seek immediate medical attention for falls, head injury or deep cuts    Follow up appointment in 12 week(s).    Next Appointment:  at 11:00 am.    Nia YEN

## 2021-04-30 LAB — INR BLD: 2.9 (ref 0.9–1.2)

## 2021-07-22 ENCOUNTER — APPOINTMENT (OUTPATIENT)
Dept: VASCULAR LAB | Facility: MEDICAL CENTER | Age: 66
End: 2021-07-22
Attending: INTERNAL MEDICINE
Payer: MEDICARE

## 2021-08-26 ENCOUNTER — DOCUMENTATION (OUTPATIENT)
Dept: VASCULAR LAB | Facility: MEDICAL CENTER | Age: 66
End: 2021-08-26

## 2021-08-26 NOTE — PROGRESS NOTES
[facility] [place] [facility]      Pt is over due for PT/INR for warfarin monitoring. Left message for patient to have INR checked ASAP.     Clinic phone number left for any questions or concerns.      Shari Mejia  PharmD

## 2021-09-22 ENCOUNTER — TELEPHONE (OUTPATIENT)
Dept: VASCULAR LAB | Facility: MEDICAL CENTER | Age: 66
End: 2021-09-22

## 2021-09-22 NOTE — TELEPHONE ENCOUNTER
"Spoke with Elena who reports she has no care giver and cannot come into the clinic.  Pt refuses to make fu appointment at this time.  Discussed perhaps changing to a doac, however pt would like to \"give it a week\" to decide     Compliance letter sent    Danica Torres, Clinical Pharmacist, CDE, CACP    "

## 2021-09-22 NOTE — LETTER
Elena Warren  [address]  [place] [address]    09/22/21    Dear Elena Warren ,    We have been unsuccessful in our attempts to contact you regarding your Anticoagulation Service appointments. Warfarin is a potent blood-thinning agent that requires monitoring to ensure that the dosage is correct for your body.  If it isn't, you could develop serious, sometimes life-threatening bleeding problems or life-threatening blood clots or stroke could result.    To monitor you effectively, we need to be able to communicate with you.  This is a requirement to be followed by our Service.       If you repeatedly fail to keep your lab appointments, you are at risk of being discharged from the Anticoagulation Service.    It is extremely important that you contact the clinic as soon as possible to arrange appropriate follow up.  We are open Monday-Friday 8 am until 5 pm.  You may reach our Service at [phone].           Sincerely,           Shamar Noble, PharmD, [facility]  Clinic Supervisor  [facility]  Outpatient Anticoagulation Service

## 2021-10-12 ENCOUNTER — TELEPHONE (OUTPATIENT)
Dept: VASCULAR LAB | Facility: MEDICAL CENTER | Age: 66
End: 2021-10-12

## 2021-10-12 NOTE — LETTER
Elena Warren  [address]  [place] [address]    10/12/21    Dear Elena Warren ,    We have been unsuccessful in our attempts to contact you regarding your Anticoagulation Service appointments. Warfarin is a potent blood-thinning agent that requires monitoring to ensure that the dosage is correct for your body.  If it isn't, you could develop serious, sometimes life-threatening bleeding problems or life-threatening blood clots or stroke could result.    To monitor you effectively, we need to be able to communicate with you.  This is a requirement to be followed by our Service.       If you repeatedly fail to keep your lab appointments, you are at risk of being discharged from the Anticoagulation Service.    It is extremely important that you contact the clinic as soon as possible to arrange appropriate follow up.  We are open Monday-Friday 8 am until 5 pm.  You may reach our Service at [phone].           Sincerely,           Shamar Noble, PharmD, [facility]  Clinic Supervisor  [facility]  Outpatient Anticoagulation Service

## 2021-10-12 NOTE — TELEPHONE ENCOUNTER
Left message for pt to have INR checked  3rd call  2nd letter sent  Danica Torrse, Clinical Pharmacist, CDE, CACP

## 2021-10-13 ENCOUNTER — APPOINTMENT (OUTPATIENT)
Dept: VASCULAR LAB | Facility: MEDICAL CENTER | Age: 66
End: 2021-10-13
Payer: MEDICARE

## 2021-10-13 ENCOUNTER — DOCUMENTATION (OUTPATIENT)
Dept: VASCULAR LAB | Facility: MEDICAL CENTER | Age: 66
End: 2021-10-13

## 2021-10-13 DIAGNOSIS — I48.91 ATRIAL FIBRILLATION, UNSPECIFIED TYPE (HCC): Primary | ICD-10-CM

## 2021-10-13 DIAGNOSIS — Z79.01 CHRONIC ANTICOAGULATION: ICD-10-CM

## 2021-10-13 NOTE — PROGRESS NOTES
Received call from [facility] that pt will need to pay out of pocket copay of $50    Pt states she cannot afford this.    Informed [facility] to cancel order    Pt is on a waitlist to get a caregiver. She states she will hopefully know on Monday. Will f/u with pt after 10/18    Alina Albert, JuanD

## 2021-10-13 NOTE — PROGRESS NOTES
S/w pt - she is unable to leave her home to get her INR checked. She refuses to switch to DO[facility]    Will order [facility] to check INR 10/14    Alina Albert, JuanD

## 2021-10-19 ENCOUNTER — ANTICOAGULATION VISIT (OUTPATIENT)
Dept: VASCULAR LAB | Facility: MEDICAL CENTER | Age: 66
End: 2021-10-19
Attending: INTERNAL MEDICINE
Payer: MEDICARE

## 2021-10-19 DIAGNOSIS — G45.9 TRANSIENT ISCHEMIC ATTACK: ICD-10-CM

## 2021-10-19 LAB — INR PPP: 3.1 (ref 2–3.5)

## 2021-10-19 PROCEDURE — 99211 OFF/OP EST MAY X REQ PHY/QHP: CPT

## 2021-10-19 PROCEDURE — 85610 PROTHROMBIN TIME: CPT

## 2021-10-19 NOTE — PROGRESS NOTES
Anticoagulation Summary  As of 10/19/2021    INR goal:  2.0-3.0   TTR:  85.9 % (6.4 y)   INR used for dosing:  3.10 (10/19/2021)   Warfarin maintenance plan:  6 mg (6 mg x 1) every Mon, Wed, Fri; 3 mg (3 mg x 1) all other days   Weekly warfarin total:  30 mg   Plan last modified:  Samira Carlin, PharmD (6/13/2018)   Next INR check:  11/9/2021   Target end date:  Indefinite    Indications    Deep vein thrombosis (DVT) (HCC) [I82.409]  Atrial fibrillation (HCC) [I48.91]  Pulmonary embolism [415.19] [I26.99]  Stroke [434.91] [I63.9]  Transient ischemic attack [435.9] [G45.9]             Anticoagulation Episode Summary     INR check location:      Preferred lab:      Send INR reminders to:      Comments:        Anticoagulation Care Providers     Provider Role Specialty Phone number    Carole Mason M.D. Referring Internal Medicine [phone]    [facility] Anticoagulation Services Responsible  [phone]                Refer to Patient Findings for HPI:  Patient Findings     Negatives:  Signs/symptoms of thrombosis, Signs/symptoms of bleeding, Laboratory test error suspected, Change in health, Change in alcohol use, Change in activity, Upcoming invasive procedure, Emergency department visit, Upcoming dental procedure, Missed doses, Extra doses, Change in medications, Change in diet/appetite, Hospital admission, Bruising, Other complaints          There were no vitals filed for this visit.  Unable to perform this visit d/t office availability    Verified current warfarin dosing schedule.    Medications reconciled   Pt is not on antiplatelet therapy      A/P   INR  slightly SUPRA-therapeutic at 3.1  Pt last seen 4/2021. Discussed rationale and importance of routine monitoring. Pt stated understanding and agreed to closer follow up at this time.    Warfarin dosing recommendation: Pt is to continue with current warfarin dosing regimen.      Pt educated to contact our clinic with any changes in medications or s/s of  bleeding or thrombosis. Pt is aware to seek immediate medical attention for falls, head injury or deep cuts.    Follow up appointment in 3 week(s)    David Khan, JuanD

## 2021-11-04 DIAGNOSIS — Z79.01 CHRONIC ANTICOAGULATION: ICD-10-CM

## 2021-11-05 DIAGNOSIS — G45.9 TRANSIENT CEREBRAL ISCHEMIA, UNSPECIFIED TYPE: ICD-10-CM

## 2021-11-05 DIAGNOSIS — I82.401 ACUTE DEEP VEIN THROMBOSIS (DVT) OF RIGHT LOWER EXTREMITY, UNSPECIFIED VEIN (HCC): ICD-10-CM

## 2021-11-05 DIAGNOSIS — I26.01 CHRONIC SEPTIC PULMONARY EMBOLISM WITH ACUTE COR PULMONALE (HCC): ICD-10-CM

## 2021-11-05 DIAGNOSIS — I48.91 ATRIAL FIBRILLATION, UNSPECIFIED TYPE (HCC): ICD-10-CM

## 2021-11-05 DIAGNOSIS — I27.82 CHRONIC SEPTIC PULMONARY EMBOLISM WITH ACUTE COR PULMONALE (HCC): ICD-10-CM

## 2021-11-05 DIAGNOSIS — G45.9 TRANSIENT ISCHEMIC ATTACK: ICD-10-CM

## 2021-11-05 DIAGNOSIS — I48.20 CHRONIC ATRIAL FIBRILLATION (HCC): ICD-10-CM

## 2021-11-05 DIAGNOSIS — I63.00 CEREBROVASCULAR ACCIDENT (CVA) DUE TO THROMBOSIS OF PRECEREBRAL ARTERY (HCC): ICD-10-CM

## 2021-11-08 RX ORDER — WARFARIN SODIUM 3 MG/1
TABLET ORAL
Qty: 90 TABLET | Refills: 0 | Status: SHIPPED | OUTPATIENT
Start: 2021-11-08

## 2021-11-09 ENCOUNTER — APPOINTMENT (OUTPATIENT)
Dept: VASCULAR LAB | Facility: MEDICAL CENTER | Age: 66
End: 2021-11-09
Attending: INTERNAL MEDICINE
Payer: MEDICARE

## 2021-12-22 ENCOUNTER — TELEPHONE (OUTPATIENT)
Dept: VASCULAR LAB | Facility: MEDICAL CENTER | Age: 66
End: 2021-12-22

## 2021-12-22 NOTE — TELEPHONE ENCOUNTER
Unable to leave message for pt to have INR checked - phone remains busy  Emergency contact phone is out of service  Letter sent    Danica Torres, Clinical Pharmacist, CDE, CACP

## 2021-12-22 NOTE — LETTER
Elena Warren  [address]  [place] [address]    12/22/21    Dear Elena Warren ,    We have been unsuccessful in our attempts to contact you regarding your Anticoagulation Service appointments. Warfarin is a potent blood-thinning agent that requires monitoring to ensure that the dosage is correct for your body.  If it isn't, you could develop serious, sometimes life-threatening bleeding problems or life-threatening blood clots or stroke could result.    To monitor you effectively, we need to be able to communicate with you.  This is a requirement to be followed by our Service.       If you repeatedly fail to keep your lab appointments, you are at risk of being discharged from the Anticoagulation Service.    It is extremely important that you contact the clinic as soon as possible to arrange appropriate follow up.  We are open Monday-Friday 8 am until 5 pm.  You may reach our Service at [phone].           Sincerely,           Shamar Noble, PharmD, [facility]  Clinic Supervisor  [facility]  Outpatient Anticoagulation Service

## 2021-12-30 NOTE — TELEPHONE ENCOUNTER
Pt c/b - she is currently admitted to [facility] for reported fungal skin infection.     Pt states she will call our clinic once she is discharged.    We will f/u periodically as well.    Kenny Vuong, JuanD, BCACP

## 2022-10-26 ENCOUNTER — TELEPHONE (OUTPATIENT)
Dept: VASCULAR LAB | Facility: MEDICAL CENTER | Age: 67
End: 2022-10-26
Payer: MEDICARE

## 2022-10-26 NOTE — TELEPHONE ENCOUNTER
[facility]    Unable to reach pt by phone or emergency contacts.  Sent letter of compliance.    Shamar Noble, PharmD

## 2022-11-14 ENCOUNTER — ANTICOAGULATION MONITORING (OUTPATIENT)
Dept: VASCULAR LAB | Facility: MEDICAL CENTER | Age: 67
End: 2022-11-14
Payer: MEDICARE

## 2022-11-14 DIAGNOSIS — G45.9 TRANSIENT ISCHEMIC ATTACK: ICD-10-CM

## 2023-09-25 ENCOUNTER — OFFICE VISIT (OUTPATIENT)
Dept: URBAN - METROPOLITAN AREA CLINIC 72 | Facility: CLINIC | Age: 68
End: 2023-09-25
Payer: COMMERCIAL

## 2023-09-25 DIAGNOSIS — H35.363 DRUSEN (DEGENERATIVE) OF MACULA, BILATERAL: ICD-10-CM

## 2023-09-25 DIAGNOSIS — H25.813 COMBINED FORMS OF AGE-RELATED CATARACT, BILATERAL: Primary | ICD-10-CM

## 2023-09-25 PROCEDURE — 99204 OFFICE O/P NEW MOD 45 MIN: CPT | Performed by: OPHTHALMOLOGY

## 2023-09-25 ASSESSMENT — INTRAOCULAR PRESSURE
OS: 10
OD: 11

## 2023-09-25 ASSESSMENT — VISUAL ACUITY
OS: 20/40
OD: 20/60

## 2023-10-30 ENCOUNTER — TECH ONLY (OUTPATIENT)
Dept: URBAN - METROPOLITAN AREA CLINIC 71 | Facility: CLINIC | Age: 68
End: 2023-10-30
Payer: COMMERCIAL

## 2023-10-30 DIAGNOSIS — H25.813 COMBINED FORMS OF AGE-RELATED CATARACT, BILATERAL: Primary | ICD-10-CM

## 2023-12-20 ENCOUNTER — OFFICE VISIT (OUTPATIENT)
Dept: URBAN - METROPOLITAN AREA CLINIC 71 | Facility: CLINIC | Age: 68
End: 2023-12-20
Payer: COMMERCIAL

## 2023-12-20 DIAGNOSIS — H35.363 DRUSEN (DEGENERATIVE) OF MACULA, BILATERAL: ICD-10-CM

## 2023-12-20 DIAGNOSIS — H25.813 COMBINED FORMS OF AGE-RELATED CATARACT, BILATERAL: Primary | ICD-10-CM

## 2023-12-20 PROCEDURE — 99214 OFFICE O/P EST MOD 30 MIN: CPT | Performed by: OPHTHALMOLOGY

## 2023-12-20 PROCEDURE — 92136 OPHTHALMIC BIOMETRY: CPT | Performed by: OPHTHALMOLOGY

## 2023-12-20 RX ORDER — KETOROLAC TROMETHAMINE 5 MG/ML
0.5 % SOLUTION OPHTHALMIC
Qty: 5 | Refills: 1 | Status: ACTIVE
Start: 2023-12-20

## 2023-12-20 ASSESSMENT — INTRAOCULAR PRESSURE
OS: 13
OD: 12

## 2023-12-20 ASSESSMENT — VISUAL ACUITY
OS: 20/40
OD: 20/60

## 2024-02-20 ENCOUNTER — SURGERY (OUTPATIENT)
Dept: URBAN - METROPOLITAN AREA SURGERY 45 | Facility: SURGERY | Age: 69
End: 2024-02-20
Payer: COMMERCIAL

## 2024-02-20 ENCOUNTER — SURGERY (OUTPATIENT)
Dept: URBAN - METROPOLITAN AREA SURGERY 44 | Facility: SURGERY | Age: 69
End: 2024-02-20
Payer: COMMERCIAL

## 2024-02-20 PROCEDURE — 66984 XCAPSL CTRC RMVL W/O ECP: CPT | Performed by: OPHTHALMOLOGY

## 2024-02-21 ENCOUNTER — POST-OPERATIVE VISIT (OUTPATIENT)
Dept: URBAN - METROPOLITAN AREA CLINIC 72 | Facility: CLINIC | Age: 69
End: 2024-02-21
Payer: COMMERCIAL

## 2024-02-21 DIAGNOSIS — Z48.810 ENCOUNTER FOR SURGICAL AFTERCARE FOLLOWING SURGERY ON A SENSE ORGAN: Primary | ICD-10-CM

## 2024-02-21 PROCEDURE — 99024 POSTOP FOLLOW-UP VISIT: CPT

## 2024-02-21 ASSESSMENT — INTRAOCULAR PRESSURE
OS: 13
OD: 12

## 2024-04-02 ENCOUNTER — SURGERY (OUTPATIENT)
Dept: URBAN - METROPOLITAN AREA SURGERY 44 | Facility: SURGERY | Age: 69
End: 2024-04-02
Payer: COMMERCIAL

## 2024-04-02 ENCOUNTER — SURGERY (OUTPATIENT)
Dept: URBAN - METROPOLITAN AREA SURGERY 45 | Facility: SURGERY | Age: 69
End: 2024-04-02
Payer: COMMERCIAL

## 2024-04-02 PROCEDURE — 66984 XCAPSL CTRC RMVL W/O ECP: CPT | Performed by: OPHTHALMOLOGY

## 2024-04-04 ENCOUNTER — POST-OPERATIVE VISIT (OUTPATIENT)
Dept: URBAN - METROPOLITAN AREA CLINIC 72 | Facility: CLINIC | Age: 69
End: 2024-04-04
Payer: COMMERCIAL

## 2024-04-04 DIAGNOSIS — Z96.1 PRESENCE OF INTRAOCULAR LENS: Primary | ICD-10-CM

## 2024-04-04 PROCEDURE — 99024 POSTOP FOLLOW-UP VISIT: CPT | Performed by: OPTOMETRIST

## 2024-04-04 ASSESSMENT — INTRAOCULAR PRESSURE
OS: 12
OD: 10

## 2024-05-20 ENCOUNTER — POST-OPERATIVE VISIT (OUTPATIENT)
Dept: URBAN - METROPOLITAN AREA CLINIC 72 | Facility: CLINIC | Age: 69
End: 2024-05-20
Payer: COMMERCIAL

## 2024-05-20 DIAGNOSIS — Z96.1 PRESENCE OF INTRAOCULAR LENS: Primary | ICD-10-CM

## 2024-05-20 DIAGNOSIS — H52.4 PRESBYOPIA: ICD-10-CM

## 2024-05-20 PROCEDURE — 99024 POSTOP FOLLOW-UP VISIT: CPT | Performed by: OPTOMETRIST

## 2024-05-20 ASSESSMENT — INTRAOCULAR PRESSURE
OS: 10
OD: 12
